# Patient Record
Sex: FEMALE | Race: WHITE | Employment: FULL TIME | URBAN - METROPOLITAN AREA
[De-identification: names, ages, dates, MRNs, and addresses within clinical notes are randomized per-mention and may not be internally consistent; named-entity substitution may affect disease eponyms.]

---

## 2017-01-04 ENCOUNTER — TELEPHONE (OUTPATIENT)
Dept: PULMONOLOGY | Age: 52
End: 2017-01-04

## 2017-01-10 ENCOUNTER — OFFICE VISIT (OUTPATIENT)
Dept: SLEEP MEDICINE | Age: 52
End: 2017-01-10

## 2017-01-10 VITALS
BODY MASS INDEX: 34.15 KG/M2 | HEIGHT: 64 IN | WEIGHT: 200 LBS | OXYGEN SATURATION: 98 % | DIASTOLIC BLOOD PRESSURE: 80 MMHG | HEART RATE: 81 BPM | SYSTOLIC BLOOD PRESSURE: 126 MMHG

## 2017-01-10 DIAGNOSIS — E66.9 NON MORBID OBESITY, UNSPECIFIED OBESITY TYPE: Chronic | ICD-10-CM

## 2017-01-10 DIAGNOSIS — J44.9 COPD, MILD (HCC): Chronic | ICD-10-CM

## 2017-01-10 DIAGNOSIS — K21.9 GASTROESOPHAGEAL REFLUX DISEASE WITHOUT ESOPHAGITIS: Chronic | ICD-10-CM

## 2017-01-10 DIAGNOSIS — E03.9 ACQUIRED HYPOTHYROIDISM: Chronic | ICD-10-CM

## 2017-01-10 DIAGNOSIS — G47.33 OBSTRUCTIVE SLEEP APNEA (ADULT) (PEDIATRIC): Primary | Chronic | ICD-10-CM

## 2017-01-10 PROCEDURE — 99214 OFFICE O/P EST MOD 30 MIN: CPT | Performed by: NURSE PRACTITIONER

## 2017-01-10 ASSESSMENT — ENCOUNTER SYMPTOMS
APNEA: 0
COUGH: 0
SINUS PRESSURE: 0
ABDOMINAL DISTENTION: 0
ABDOMINAL PAIN: 0
RHINORRHEA: 0
SHORTNESS OF BREATH: 0

## 2017-01-10 ASSESSMENT — SLEEP AND FATIGUE QUESTIONNAIRES
HOW LIKELY ARE YOU TO NOD OFF OR FALL ASLEEP IN A CAR, WHILE STOPPED FOR A FEW MINUTES IN TRAFFIC: 0
HOW LIKELY ARE YOU TO NOD OFF OR FALL ASLEEP WHEN YOU ARE A PASSENGER IN A CAR FOR AN HOUR WITHOUT A BREAK: 3
HOW LIKELY ARE YOU TO NOD OFF OR FALL ASLEEP WHILE WATCHING TV: 3
HOW LIKELY ARE YOU TO NOD OFF OR FALL ASLEEP WHILE SITTING AND TALKING TO SOMEONE: 0
HOW LIKELY ARE YOU TO NOD OFF OR FALL ASLEEP WHILE SITTING QUIETLY AFTER LUNCH WITHOUT ALCOHOL: 2
HOW LIKELY ARE YOU TO NOD OFF OR FALL ASLEEP WHILE SITTING AND READING: 3
HOW LIKELY ARE YOU TO NOD OFF OR FALL ASLEEP WHILE SITTING INACTIVE IN A PUBLIC PLACE: 1
HOW LIKELY ARE YOU TO NOD OFF OR FALL ASLEEP WHILE LYING DOWN TO REST IN THE AFTERNOON WHEN CIRCUMSTANCES PERMIT: 1
ESS TOTAL SCORE: 13

## 2017-05-30 ENCOUNTER — OFFICE VISIT (OUTPATIENT)
Dept: PULMONOLOGY | Age: 52
End: 2017-05-30

## 2017-05-30 VITALS
SYSTOLIC BLOOD PRESSURE: 129 MMHG | WEIGHT: 206 LBS | BODY MASS INDEX: 35.36 KG/M2 | DIASTOLIC BLOOD PRESSURE: 78 MMHG | HEART RATE: 80 BPM

## 2017-05-30 DIAGNOSIS — K21.9 GASTROESOPHAGEAL REFLUX DISEASE WITHOUT ESOPHAGITIS: Chronic | ICD-10-CM

## 2017-05-30 DIAGNOSIS — R06.02 SOB (SHORTNESS OF BREATH): Primary | ICD-10-CM

## 2017-05-30 DIAGNOSIS — J44.9 COPD, MILD (HCC): Chronic | ICD-10-CM

## 2017-05-30 DIAGNOSIS — G47.33 OBSTRUCTIVE SLEEP APNEA (ADULT) (PEDIATRIC): Chronic | ICD-10-CM

## 2017-05-30 PROCEDURE — 99214 OFFICE O/P EST MOD 30 MIN: CPT | Performed by: INTERNAL MEDICINE

## 2017-05-30 RX ORDER — OMEPRAZOLE 40 MG/1
40 CAPSULE, DELAYED RELEASE ORAL DAILY
Qty: 90 CAPSULE | Refills: 3 | Status: SHIPPED | OUTPATIENT
Start: 2017-05-30 | End: 2018-01-24 | Stop reason: SDUPTHER

## 2017-05-30 RX ORDER — LEVOTHYROXINE SODIUM 100 MCG
1 TABLET ORAL DAILY
COMMUNITY
Start: 2017-05-26

## 2017-05-30 RX ORDER — IPRATROPIUM BROMIDE AND ALBUTEROL SULFATE 2.5; .5 MG/3ML; MG/3ML
3 SOLUTION RESPIRATORY (INHALATION) EVERY 6 HOURS PRN
Qty: 360 ML | Refills: 3 | Status: SHIPPED | OUTPATIENT
Start: 2017-05-30 | End: 2017-12-22 | Stop reason: SDUPTHER

## 2017-11-28 ENCOUNTER — HOSPITAL ENCOUNTER (OUTPATIENT)
Dept: OTHER | Age: 52
Discharge: HOME | End: 2017-11-28
Attending: FAMILY MEDICINE | Admitting: FAMILY MEDICINE

## 2017-11-28 DIAGNOSIS — R94.5 ABNORMAL RESULTS OF LIVER FUNCTION STUDIES: ICD-10-CM

## 2017-12-04 RX ORDER — MONTELUKAST SODIUM 10 MG/1
TABLET ORAL
Qty: 90 TABLET | Refills: 2 | Status: SHIPPED | OUTPATIENT
Start: 2017-12-04 | End: 2018-08-08 | Stop reason: SDUPTHER

## 2017-12-22 ENCOUNTER — OFFICE VISIT (OUTPATIENT)
Dept: PULMONOLOGY | Age: 52
End: 2017-12-22

## 2017-12-22 VITALS
HEART RATE: 79 BPM | SYSTOLIC BLOOD PRESSURE: 119 MMHG | WEIGHT: 202 LBS | BODY MASS INDEX: 34.67 KG/M2 | DIASTOLIC BLOOD PRESSURE: 78 MMHG

## 2017-12-22 DIAGNOSIS — R53.82 CHRONIC FATIGUE: ICD-10-CM

## 2017-12-22 DIAGNOSIS — R06.02 SOB (SHORTNESS OF BREATH): Primary | ICD-10-CM

## 2017-12-22 DIAGNOSIS — J44.9 COPD, MILD (HCC): Chronic | ICD-10-CM

## 2017-12-22 DIAGNOSIS — G47.33 OBSTRUCTIVE SLEEP APNEA: Chronic | ICD-10-CM

## 2017-12-22 DIAGNOSIS — K21.9 GASTROESOPHAGEAL REFLUX DISEASE WITHOUT ESOPHAGITIS: Chronic | ICD-10-CM

## 2017-12-22 PROCEDURE — 99214 OFFICE O/P EST MOD 30 MIN: CPT | Performed by: INTERNAL MEDICINE

## 2017-12-22 NOTE — PROGRESS NOTES
extremities. There is no obvious skin rash. No focal neuro deficicts  Normal mood and affect    Allergies   Allergen Reactions    Codeine      Prior to Visit Medications    Medication Sig Taking? Authorizing Provider   montelukast (SINGULAIR) 10 MG tablet TAKE 1 TABLET DAILY Yes Leonardo Carranza CNP   SYNTHROID 100 MCG tablet Take 1 tablet by mouth daily Yes Historical Provider, MD   aclidinium (TUDORZA PRESSAIR) 400 MCG/ACT AEPB inhaler Inhale 1 puff into the lungs 2 times daily Yes Olive Marquez MD   albuterol-ipratropium (COMBIVENT RESPIMAT)  MCG/ACT AERS inhaler Inhale 1 puff into the lungs every 6 hours Yes Olive Marquez MD   omeprazole (PRILOSEC) 40 MG delayed release capsule Take 1 capsule by mouth daily Yes Olive Marquez MD   pregabalin (LYRICA) 150 MG capsule Take 150 mg by mouth 2 times daily Yes Historical Provider, MD   ondansetron (ZOFRAN-ODT) 4 MG disintegrating tablet Take 4 mg by mouth every 8 hours as needed for Nausea or Vomiting Yes Historical Provider, MD   nystatin (MYCOSTATIN) 624964 UNIT/ML suspension Take 500,000 Units by mouth 4 times daily Yes Historical Provider, MD   omeprazole (PRILOSEC) 40 MG capsule TAKE 1 CAPSULE BY MOUTH DAILY Yes Olive Marquez MD   ipratropium-albuterol (DUONEB) 0.5-2.5 (3) MG/3ML SOLN nebulizer solution INHALE 3 MLS INTO THE LUNGS EVERY 4 HOURS. Yes Olive Marquez MD   albuterol (PROVENTIL HFA;VENTOLIN HFA) 108 (90 BASE) MCG/ACT inhaler Inhale 2 puffs into the lungs every 6 hours as needed Yes Olive Marquez MD   cyanocobalamin 1000 MCG/ML injection Inject 1,000 mcg into the muscle every 7 days. Yes Historical Provider, MD   traMADol (ULTRAM) 50 MG tablet Take 50 mg by mouth every 6 hours as needed. Yes Historical Provider, MD   LORazepam (ATIVAN) 0.5 MG tablet Take 0.5 mg by mouth every 6 hours as needed.    Yes Historical Provider, MD   fluticasone-salmeterol (ADVAIR DISKUS) 250-50 MCG/DOSE AEPB Inhale 1 puff into the lungs

## 2018-01-09 ENCOUNTER — OFFICE VISIT (OUTPATIENT)
Dept: SLEEP MEDICINE | Age: 53
End: 2018-01-09

## 2018-01-09 VITALS
BODY MASS INDEX: 35 KG/M2 | OXYGEN SATURATION: 97 % | SYSTOLIC BLOOD PRESSURE: 122 MMHG | DIASTOLIC BLOOD PRESSURE: 72 MMHG | HEIGHT: 64 IN | HEART RATE: 88 BPM | WEIGHT: 205 LBS

## 2018-01-09 DIAGNOSIS — J44.9 COPD, MILD (HCC): Chronic | ICD-10-CM

## 2018-01-09 DIAGNOSIS — K21.9 GASTROESOPHAGEAL REFLUX DISEASE WITHOUT ESOPHAGITIS: Chronic | ICD-10-CM

## 2018-01-09 DIAGNOSIS — E66.9 NON MORBID OBESITY, UNSPECIFIED OBESITY TYPE: Chronic | ICD-10-CM

## 2018-01-09 DIAGNOSIS — G47.33 OBSTRUCTIVE SLEEP APNEA: Primary | Chronic | ICD-10-CM

## 2018-01-09 DIAGNOSIS — E03.9 ACQUIRED HYPOTHYROIDISM: Chronic | ICD-10-CM

## 2018-01-09 PROCEDURE — 99214 OFFICE O/P EST MOD 30 MIN: CPT | Performed by: NURSE PRACTITIONER

## 2018-01-09 RX ORDER — HYDROCHLOROTHIAZIDE 25 MG/1
25 TABLET ORAL
COMMUNITY
Start: 2017-12-18 | End: 2019-01-15

## 2018-01-09 RX ORDER — CLOTRIMAZOLE AND BETAMETHASONE DIPROPIONATE 10; .64 MG/G; MG/G
1 CREAM TOPICAL 2 TIMES DAILY
COMMUNITY
Start: 2018-01-05 | End: 2022-04-05

## 2018-01-09 RX ORDER — INSULIN DETEMIR 100 [IU]/ML
25 INJECTION, SOLUTION SUBCUTANEOUS 2 TIMES DAILY
COMMUNITY
Start: 2017-12-29 | End: 2019-01-15

## 2018-01-09 RX ORDER — ATORVASTATIN CALCIUM 10 MG/1
10 TABLET, FILM COATED ORAL DAILY
COMMUNITY
Start: 2017-11-27

## 2018-01-09 RX ORDER — GLYBURIDE 5 MG/1
5 TABLET ORAL 2 TIMES DAILY
COMMUNITY
Start: 2017-12-18 | End: 2019-01-15

## 2018-01-09 RX ORDER — FLUCONAZOLE 150 MG/1
150 TABLET ORAL DAILY
COMMUNITY
Start: 2018-01-05 | End: 2018-03-07 | Stop reason: ALTCHOICE

## 2018-01-09 ASSESSMENT — ENCOUNTER SYMPTOMS
COUGH: 0
SHORTNESS OF BREATH: 0
ABDOMINAL DISTENTION: 0
APNEA: 0
RHINORRHEA: 0
SINUS PRESSURE: 0
ABDOMINAL PAIN: 0

## 2018-01-09 NOTE — PROGRESS NOTES
daily 5/26/17  Yes Historical Provider, MD   aclidinium (TUDORZA PRESSAIR) 400 MCG/ACT AEPB inhaler Inhale 1 puff into the lungs 2 times daily 5/30/17  Yes Luis Painting MD   albuterol-ipratropium (COMBIVENT RESPIMAT)  MCG/ACT AERS inhaler Inhale 1 puff into the lungs every 6 hours 5/30/17  Yes Luis Painting MD   omeprazole (PRILOSEC) 40 MG capsule TAKE 1 CAPSULE BY MOUTH DAILY 5/31/16  Yes Luis Painting MD   ipratropium-albuterol (DUONEB) 0.5-2.5 (3) MG/3ML SOLN nebulizer solution INHALE 3 MLS INTO THE LUNGS EVERY 4 HOURS. 5/31/16  Yes Luis Painting MD   albuterol (PROVENTIL HFA;VENTOLIN HFA) 108 (90 BASE) MCG/ACT inhaler Inhale 2 puffs into the lungs every 6 hours as needed 5/15/15  Yes Luis Painting MD   cyanocobalamin 1000 MCG/ML injection Inject 1,000 mcg into the muscle every 7 days. Yes Historical Provider, MD   traMADol (ULTRAM) 50 MG tablet Take 50 mg by mouth every 6 hours as needed. Yes Historical Provider, MD   LORazepam (ATIVAN) 0.5 MG tablet Take 0.5 mg by mouth every 6 hours as needed.      Yes Historical Provider, MD   omeprazole (PRILOSEC) 40 MG delayed release capsule Take 1 capsule by mouth daily 5/30/17   Luis Painting MD   pregabalin (LYRICA) 150 MG capsule Take 150 mg by mouth 2 times daily    Historical Provider, MD   ondansetron (ZOFRAN-ODT) 4 MG disintegrating tablet Take 4 mg by mouth every 8 hours as needed for Nausea or Vomiting    Historical Provider, MD   nystatin (MYCOSTATIN) 664207 UNIT/ML suspension Take 500,000 Units by mouth 4 times daily    Historical Provider, MD   fluticasone-salmeterol (ADVAIR DISKUS) 250-50 MCG/DOSE AEPB Inhale 1 puff into the lungs every 12 hours 5/31/16   Luis Painting MD       Allergies as of 01/09/2018 - Review Complete 01/09/2018   Allergen Reaction Noted    Codeine  07/31/2013       Patient Active Problem List   Diagnosis    COPD (chronic obstructive pulmonary disease) ([facility]    GERD (gastroesophageal reflux disease)    Fatigue    COPD, mild (HCC)    Acquired hypothyroidism    Obstructive sleep apnea    SOB (shortness of breath)       Past Medical History:   Diagnosis Date    Anxiety     B12 deficiency anemia     COPD (chronic obstructive pulmonary disease) (HCC)     Depression     Fatty liver     Fibromyalgia     PT DENIES    Obstructive sleep apnea        Past Surgical History:   Procedure Laterality Date    CHOLECYSTECTOMY  09/13/2016    COLONOSCOPY  april 13, 2015    CYST REMOVAL      HEMORRHOID SURGERY      1997    HEMORRHOID SURGERY      TUBAL LIGATION      WISDOM TOOTH EXTRACTION         Family History   Problem Relation Age of Onset    Diabetes Mother     Arthritis Father     Cancer Maternal Aunt     Arthritis Maternal Aunt     High Blood Pressure Maternal Grandmother     Cancer Maternal Grandfather        Vitals:  Weight BMI   Wt Readings from Last 3 Encounters:   01/09/18 205 lb (93 kg)   12/22/17 202 lb (91.6 kg)   05/30/17 206 lb (93.4 kg)    Body mass index is 35.19 kg/m². BP HR SaO2   BP Readings from Last 3 Encounters:   01/09/18 122/72   12/22/17 119/78   05/30/17 129/78    Pulse Readings from Last 3 Encounters:   01/09/18 88   12/22/17 79   05/30/17 80    SpO2 Readings from Last 3 Encounters:   01/09/18 97%   01/10/17 98%   11/30/16 98%        Assessment:     1. Obstructive sleep apnea Stable    2. COPD, mild ([address] Stable    3. Gastroesophageal reflux disease without esophagitis Stable    4. Acquired hypothyroidism Stable    5. Non morbid obesity, unspecified obesity type Stable        The chronic medical conditions listed are directly related to the primary diagnosis listed above. The management of the primary diagnosis affects the secondary diagnosis and vice versa.   Plan:   - Educated patient and reviewed compliance download with pt.    -Supplies and parts as needed for her machine, these are medically necessary.    - Patient using [employer] for supplies  -Continue

## 2018-01-24 RX ORDER — OMEPRAZOLE 40 MG/1
40 CAPSULE, DELAYED RELEASE ORAL 2 TIMES DAILY
Qty: 180 CAPSULE | Refills: 3 | Status: SHIPPED | OUTPATIENT
Start: 2018-01-24 | End: 2018-04-02 | Stop reason: SDUPTHER

## 2018-01-24 NOTE — TELEPHONE ENCOUNTER
From: Kalin Sarabia  Sent: 1/18/2018 9:37 PM EST  Subject: Medication Renewal Request    Kalin Cornell would like a refill of the following medications:  omeprazole (PRILOSEC) 40 MG delayed release capsule Heaven Maradiaga MD]    Preferred pharmacy: [address] [place] [address] , [address] [facility] [phone] - F [phone]    Comment: In my early 2017 Dr. Lucy Moore and I discussed me taking this medicine 2x a day and the refills have not be updated to reflect this. I need an immediate 90 day x2 times a day prescription submitted to [facility] as soon as possible, please. Thank you.

## 2018-04-05 ENCOUNTER — TELEPHONE (OUTPATIENT)
Dept: SLEEP MEDICINE | Age: 53
End: 2018-04-05

## 2018-04-19 ENCOUNTER — HOSPITAL ENCOUNTER (OUTPATIENT)
Dept: ENDOSCOPY | Age: 53
Discharge: HOME | End: 2018-04-19
Attending: INTERNAL MEDICINE | Admitting: INTERNAL MEDICINE

## 2018-04-19 LAB
GLUCOSE BLD-MCNC: 112 MG/DL (ref 70–99)
GLUCOSE BLD-MCNC: 124 MG/DL (ref 70–99)
GLUCOSE BLD-MCNC: 140 MG/DL (ref 70–99)
PERFORMED ON: ABNORMAL
POC POTASSIUM: 3.6 MMOL/L (ref 3.5–5.1)
POC SAMPLE TYPE: ABNORMAL

## 2018-05-02 ENCOUNTER — HOSPITAL ENCOUNTER (OUTPATIENT)
Dept: OTHER | Age: 53
Discharge: HOME | End: 2018-05-02
Attending: FAMILY MEDICINE | Admitting: FAMILY MEDICINE

## 2018-05-02 VITALS — WEIGHT: 208 LBS | BODY MASS INDEX: 35.51 KG/M2 | HEIGHT: 64 IN

## 2018-05-02 DIAGNOSIS — D32.0 BENIGN MENINGIOMA OF BRAIN (HCC): ICD-10-CM

## 2018-05-02 RX ORDER — FLUDEOXYGLUCOSE F 18 200 MCI/ML
13.4 INJECTION, SOLUTION INTRAVENOUS
Status: COMPLETED | OUTPATIENT
Start: 2018-05-02 | End: 2018-05-02

## 2018-05-02 RX ADMIN — FLUDEOXYGLUCOSE F 18 13.4 MILLICURIE: 200 INJECTION, SOLUTION INTRAVENOUS at 08:21

## 2018-06-20 ENCOUNTER — OFFICE VISIT (OUTPATIENT)
Dept: PULMONOLOGY | Age: 53
End: 2018-06-20

## 2018-06-20 ENCOUNTER — TELEPHONE (OUTPATIENT)
Dept: PULMONOLOGY | Age: 53
End: 2018-06-20

## 2018-06-20 VITALS
SYSTOLIC BLOOD PRESSURE: 139 MMHG | WEIGHT: 205 LBS | HEART RATE: 79 BPM | BODY MASS INDEX: 35.19 KG/M2 | OXYGEN SATURATION: 97 % | DIASTOLIC BLOOD PRESSURE: 85 MMHG

## 2018-06-20 DIAGNOSIS — J44.9 COPD, MILD (HCC): Chronic | ICD-10-CM

## 2018-06-20 DIAGNOSIS — R06.02 SOB (SHORTNESS OF BREATH): Primary | ICD-10-CM

## 2018-06-20 DIAGNOSIS — K21.9 GASTROESOPHAGEAL REFLUX DISEASE WITHOUT ESOPHAGITIS: Chronic | ICD-10-CM

## 2018-06-20 DIAGNOSIS — G47.33 OBSTRUCTIVE SLEEP APNEA: Chronic | ICD-10-CM

## 2018-06-20 PROCEDURE — 99214 OFFICE O/P EST MOD 30 MIN: CPT | Performed by: INTERNAL MEDICINE

## 2018-06-20 RX ORDER — ALBUTEROL SULFATE 90 UG/1
2 AEROSOL, METERED RESPIRATORY (INHALATION) EVERY 6 HOURS PRN
Qty: 3 INHALER | Refills: 3 | Status: SHIPPED | OUTPATIENT
Start: 2018-06-20 | End: 2018-10-05 | Stop reason: SDUPTHER

## 2018-06-20 RX ORDER — OMEPRAZOLE 40 MG/1
CAPSULE, DELAYED RELEASE ORAL
Qty: 90 CAPSULE | Refills: 3 | Status: SHIPPED | OUTPATIENT
Start: 2018-06-20 | End: 2018-11-05 | Stop reason: SDUPTHER

## 2018-06-20 RX ORDER — IPRATROPIUM BROMIDE AND ALBUTEROL SULFATE 2.5; .5 MG/3ML; MG/3ML
SOLUTION RESPIRATORY (INHALATION)
Qty: 360 VIAL | Refills: 3 | Status: SHIPPED | OUTPATIENT
Start: 2018-06-20 | End: 2019-06-26 | Stop reason: SDUPTHER

## 2018-08-08 ENCOUNTER — HOSPITAL ENCOUNTER (OUTPATIENT)
Dept: PULMONOLOGY | Age: 53
Discharge: HOME | End: 2018-08-08
Attending: INTERNAL MEDICINE | Admitting: INTERNAL MEDICINE

## 2018-08-08 VITALS — OXYGEN SATURATION: 95 %

## 2018-08-08 DIAGNOSIS — J44.9 COPD, MILD (HCC): Chronic | ICD-10-CM

## 2018-08-08 DIAGNOSIS — R06.02 SOB (SHORTNESS OF BREATH): ICD-10-CM

## 2018-08-08 DIAGNOSIS — R06.02 SHORTNESS OF BREATH: ICD-10-CM

## 2018-08-08 RX ORDER — MONTELUKAST SODIUM 10 MG/1
TABLET ORAL
Qty: 90 TABLET | Refills: 2 | Status: SHIPPED | OUTPATIENT
Start: 2018-08-08 | End: 2019-05-06 | Stop reason: SDUPTHER

## 2018-09-18 ENCOUNTER — TELEPHONE (OUTPATIENT)
Dept: PULMONOLOGY | Age: 53
End: 2018-09-18

## 2018-09-18 NOTE — TELEPHONE ENCOUNTER
Patient calling for Results    What type of test? : PFT    Who ordered the test? : DR. Kathleen Auguste    When was it done? : 18    Where was it done? : [facility]    Pt # [phone]        REFILLS:     Type of Medication: TUDORZA    Dosage: 400 MCG/ACT    How are you takin Akanksha Acuna and Location: [address] [place] [address] phone number: [phone]    When was the last appointment: 18, NEXT OV 18    Pt # [phone]

## 2018-10-08 RX ORDER — ALBUTEROL SULFATE 90 UG/1
2 AEROSOL, METERED RESPIRATORY (INHALATION) EVERY 6 HOURS PRN
Qty: 3 INHALER | Refills: 3 | Status: SHIPPED | OUTPATIENT
Start: 2018-10-08 | End: 2019-06-26 | Stop reason: SDUPTHER

## 2018-11-05 ENCOUNTER — TELEPHONE (OUTPATIENT)
Dept: PULMONOLOGY | Age: 53
End: 2018-11-05

## 2018-11-05 RX ORDER — OMEPRAZOLE 40 MG/1
CAPSULE, DELAYED RELEASE ORAL
Qty: 180 CAPSULE | Refills: 3 | Status: SHIPPED | OUTPATIENT
Start: 2018-11-05 | End: 2019-06-26 | Stop reason: SDUPTHER

## 2018-12-10 ENCOUNTER — TELEPHONE (OUTPATIENT)
Dept: PULMONOLOGY | Age: 53
End: 2018-12-10

## 2018-12-19 ENCOUNTER — OFFICE VISIT (OUTPATIENT)
Dept: PULMONOLOGY | Age: 53
End: 2018-12-19
Payer: COMMERCIAL

## 2018-12-19 VITALS
BODY MASS INDEX: 34.5 KG/M2 | SYSTOLIC BLOOD PRESSURE: 133 MMHG | OXYGEN SATURATION: 97 % | DIASTOLIC BLOOD PRESSURE: 79 MMHG | HEART RATE: 85 BPM | WEIGHT: 201 LBS

## 2018-12-19 DIAGNOSIS — J44.9 COPD, MILD (HCC): Chronic | ICD-10-CM

## 2018-12-19 DIAGNOSIS — G47.33 OBSTRUCTIVE SLEEP APNEA: Chronic | ICD-10-CM

## 2018-12-19 DIAGNOSIS — R06.02 SOB (SHORTNESS OF BREATH): Primary | ICD-10-CM

## 2018-12-19 DIAGNOSIS — K21.9 GASTROESOPHAGEAL REFLUX DISEASE WITHOUT ESOPHAGITIS: Chronic | ICD-10-CM

## 2018-12-19 PROCEDURE — 99214 OFFICE O/P EST MOD 30 MIN: CPT | Performed by: INTERNAL MEDICINE

## 2018-12-19 RX ORDER — DOXYCYCLINE HYCLATE 100 MG/1
100 CAPSULE ORAL 2 TIMES DAILY
Qty: 20 CAPSULE | Refills: 0 | Status: SHIPPED | OUTPATIENT
Start: 2018-12-19 | End: 2018-12-29

## 2018-12-19 NOTE — PROGRESS NOTES
clotrimazole-betamethasone (LOTRISONE) 1-0.05 % cream Apply 1 % topically 2 times daily  Historical Provider, MD   glyBURIDE (DIABETA) 5 MG tablet Take 5 mg by mouth 2 times daily  Historical Provider, MD   hydrochlorothiazide (HYDRODIURIL) 25 MG tablet Take 25 mg by mouth Daily with lunch  Historical Provider, MD   LEVEMIR FLEXTOUCH 100 UNIT/ML injection pen Inject 25 Units as directed 2 times daily  Historical Provider, MD   SYNTHROID 100 MCG tablet Take 1 tablet by mouth daily  Historical Provider, MD   nystatin (MYCOSTATIN) 071958 UNIT/ML suspension Take 500,000 Units by mouth 4 times daily  Historical Provider, MD   fluticasone-salmeterol (ADVAIR DISKUS) 250-50 MCG/DOSE AEPB Inhale 1 puff into the lungs every 12 hours  Corey Atwood MD   cyanocobalamin 1000 MCG/ML injection Inject 1,000 mcg into the muscle every 7 days. Historical Provider, MD   traMADol (ULTRAM) 50 MG tablet Take 50 mg by mouth every 6 hours as needed. Historical Provider, MD   LORazepam (ATIVAN) 0.5 MG tablet Take 0.5 mg by mouth every 6 hours as needed. Historical Provider, MD       Vitals:    12/19/18 1439   BP: 133/79   Pulse: 85   SpO2: 97%   Weight: 201 lb (91.2 kg)     Body mass index is 34.5 kg/m².      Wt Readings from Last 3 Encounters:   12/19/18 201 lb (91.2 kg)   06/20/18 205 lb (93 kg)   05/02/18 208 lb (94.3 kg)     BP Readings from Last 3 Encounters:   12/19/18 133/79   06/20/18 139/85   01/09/18 122/72        History   Smoking Status    Former Smoker    Packs/day: 1.00    Years: 27.00    Types: Cigarettes    Quit date: 12/17/2012   Smokeless Tobacco    Never Used

## 2019-01-15 ENCOUNTER — OFFICE VISIT (OUTPATIENT)
Dept: PULMONOLOGY | Age: 54
End: 2019-01-15
Payer: COMMERCIAL

## 2019-01-15 VITALS
OXYGEN SATURATION: 98 % | DIASTOLIC BLOOD PRESSURE: 70 MMHG | SYSTOLIC BLOOD PRESSURE: 118 MMHG | BODY MASS INDEX: 34.15 KG/M2 | HEIGHT: 64 IN | HEART RATE: 88 BPM | WEIGHT: 200 LBS

## 2019-01-15 DIAGNOSIS — K21.9 GASTROESOPHAGEAL REFLUX DISEASE WITHOUT ESOPHAGITIS: Chronic | ICD-10-CM

## 2019-01-15 DIAGNOSIS — J44.9 CHRONIC OBSTRUCTIVE PULMONARY DISEASE, UNSPECIFIED COPD TYPE (HCC): Chronic | ICD-10-CM

## 2019-01-15 DIAGNOSIS — G47.33 OBSTRUCTIVE SLEEP APNEA: Primary | Chronic | ICD-10-CM

## 2019-01-15 DIAGNOSIS — E66.2 CLASS 1 OBESITY WITH ALVEOLAR HYPOVENTILATION WITHOUT SERIOUS COMORBIDITY WITH BODY MASS INDEX (BMI) OF 34.0 TO 34.9 IN ADULT (HCC): ICD-10-CM

## 2019-01-15 PROBLEM — E66.811 CLASS 1 OBESITY WITH ALVEOLAR HYPOVENTILATION WITHOUT SERIOUS COMORBIDITY WITH BODY MASS INDEX (BMI) OF 34.0 TO 34.9 IN ADULT: Status: ACTIVE | Noted: 2019-01-15

## 2019-01-15 PROCEDURE — 99214 OFFICE O/P EST MOD 30 MIN: CPT | Performed by: NURSE PRACTITIONER

## 2019-01-15 ASSESSMENT — SLEEP AND FATIGUE QUESTIONNAIRES
HOW LIKELY ARE YOU TO NOD OFF OR FALL ASLEEP WHEN YOU ARE A PASSENGER IN A CAR FOR AN HOUR WITHOUT A BREAK: 3
HOW LIKELY ARE YOU TO NOD OFF OR FALL ASLEEP WHILE SITTING AND TALKING TO SOMEONE: 1
HOW LIKELY ARE YOU TO NOD OFF OR FALL ASLEEP WHILE SITTING QUIETLY AFTER LUNCH WITHOUT ALCOHOL: 3
HOW LIKELY ARE YOU TO NOD OFF OR FALL ASLEEP WHILE SITTING INACTIVE IN A PUBLIC PLACE: 2
HOW LIKELY ARE YOU TO NOD OFF OR FALL ASLEEP WHILE WATCHING TV: 3
HOW LIKELY ARE YOU TO NOD OFF OR FALL ASLEEP IN A CAR, WHILE STOPPED FOR A FEW MINUTES IN TRAFFIC: 0
HOW LIKELY ARE YOU TO NOD OFF OR FALL ASLEEP WHILE LYING DOWN TO REST IN THE AFTERNOON WHEN CIRCUMSTANCES PERMIT: 3
HOW LIKELY ARE YOU TO NOD OFF OR FALL ASLEEP WHILE SITTING AND READING: 3
ESS TOTAL SCORE: 18

## 2019-01-15 ASSESSMENT — ENCOUNTER SYMPTOMS
RHINORRHEA: 0
SHORTNESS OF BREATH: 0
APNEA: 0
ABDOMINAL DISTENTION: 0
ABDOMINAL PAIN: 0
SINUS PRESSURE: 0
EYE REDNESS: 0
EYE PAIN: 0
COUGH: 0

## 2019-05-06 RX ORDER — MONTELUKAST SODIUM 10 MG/1
TABLET ORAL
Qty: 90 TABLET | Refills: 3 | Status: SHIPPED | OUTPATIENT
Start: 2019-05-06 | End: 2020-06-22

## 2019-05-29 ENCOUNTER — HOSPITAL ENCOUNTER (OUTPATIENT)
Age: 54
Discharge: HOME | End: 2019-05-29
Payer: COMMERCIAL

## 2019-05-29 ENCOUNTER — HOSPITAL ENCOUNTER (OUTPATIENT)
Dept: GENERAL RADIOLOGY | Age: 54
Discharge: HOME | End: 2019-05-29
Payer: COMMERCIAL

## 2019-05-29 DIAGNOSIS — J06.9 ACUTE RESPIRATORY DISEASE: ICD-10-CM

## 2019-05-29 PROCEDURE — 71046 X-RAY EXAM CHEST 2 VIEWS: CPT

## 2019-06-26 ENCOUNTER — OFFICE VISIT (OUTPATIENT)
Dept: PULMONOLOGY | Age: 54
End: 2019-06-26
Payer: COMMERCIAL

## 2019-06-26 VITALS
OXYGEN SATURATION: 98 % | HEART RATE: 85 BPM | SYSTOLIC BLOOD PRESSURE: 135 MMHG | DIASTOLIC BLOOD PRESSURE: 85 MMHG | WEIGHT: 204 LBS | BODY MASS INDEX: 35.02 KG/M2

## 2019-06-26 DIAGNOSIS — G47.33 OBSTRUCTIVE SLEEP APNEA: Chronic | ICD-10-CM

## 2019-06-26 DIAGNOSIS — Z87.891 FORMER SMOKER: ICD-10-CM

## 2019-06-26 DIAGNOSIS — R06.02 SOB (SHORTNESS OF BREATH): Primary | ICD-10-CM

## 2019-06-26 DIAGNOSIS — K21.9 GASTROESOPHAGEAL REFLUX DISEASE WITHOUT ESOPHAGITIS: Chronic | ICD-10-CM

## 2019-06-26 DIAGNOSIS — J44.9 COPD, MILD (HCC): Chronic | ICD-10-CM

## 2019-06-26 PROCEDURE — 99214 OFFICE O/P EST MOD 30 MIN: CPT | Performed by: INTERNAL MEDICINE

## 2019-06-26 RX ORDER — OMEPRAZOLE 40 MG/1
CAPSULE, DELAYED RELEASE ORAL
Qty: 180 CAPSULE | Refills: 3 | Status: SHIPPED | OUTPATIENT
Start: 2019-06-26 | End: 2020-07-07

## 2019-06-26 RX ORDER — IPRATROPIUM BROMIDE AND ALBUTEROL SULFATE 2.5; .5 MG/3ML; MG/3ML
SOLUTION RESPIRATORY (INHALATION)
Qty: 360 VIAL | Refills: 3 | Status: SHIPPED | OUTPATIENT
Start: 2019-06-26 | End: 2019-06-27 | Stop reason: DRUGHIGH

## 2019-06-26 RX ORDER — ALBUTEROL SULFATE 90 UG/1
2 AEROSOL, METERED RESPIRATORY (INHALATION) EVERY 6 HOURS PRN
Qty: 3 INHALER | Refills: 3 | Status: SHIPPED | OUTPATIENT
Start: 2019-06-26 | End: 2020-07-13

## 2019-06-26 NOTE — PROGRESS NOTES
[facility]  Progress Note  Saundra Reyes MD       Arlette Delaney   YOB: 1965    Date of Visit:  6/26/2019    Assessment/Plan:  1. SOB (shortness of breath)  Better since her flare up    2. COPD, mild ([address]  PFT 10/15:  Spirometry reveals normal FVC and FEV-1. FEV-1/FVC ratio is reduced. Lung  volumes reveal normal total lung capacity. Vital capacity is increased. Residual volume is decreased. Diffusion capacity is mildly decreased at 74%  of predicted. In comparison to a study from August of 2013, spirometry is  similar.       IMPRESSION: Mild obstructive lung disease. PFT 8/18:  INTERPRETATION:  Spirometry attempts were acceptable and reproducible. FVC  was normal at 3.17 liters, 91% predicted and normal FEV1 2.46 liters, 90%  predicted. FEV1/FVC ratio was normal.  Lung volumes showed normal total  lung capacity of 90% predicted. Diffusion capacity showed decreased DLCO  of 70% predicted.     IMPRESSION:  Normal spirometry and lung volumes with mild decrease in  diffusion capacity. In comparison to the test that was done in 10/2015,  FEV1 has improved by 9%. Advair==> gave her thrush  Tudorza  Duoneb/Combivent prn. And in advance of activity    Now sounds clear    3. Obstructive sleep apnea (adult) (pediatric)  CPAP settings: Auto CPAP mode Pmin-6 Pmax-16 Flex-3 Hum-3 Average P- 7.6 Comp 7.5 hrs/night Download AHI - 2.1/hr Mask- Nasal Pillows Machine download/SD card data reviewed and documented. Excellent treatment effect  Follows with Dr Barb Herr    4. 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 Gastroesophageal reflux disease without esophagitis  Omeprazole 40 mg bid helps     6. Former smoker  LDCT chest afte rshe turns 53      FOLLOW UP: 6 months      Chief Complaint   Patient presents with    Shortness of Breath     was sick from Feb to end of May.  Caught the Flu from her son    Alisa Needs albuterol-ipratropium (COMBIVENT RESPIMAT)  MCG/ACT AERS inhaler Inhale 1 puff into the lungs every 6 hours  Adrienne Arthur MD   ipratropium-albuterol (DUONEB) 0.5-2.5 (3) MG/3ML SOLN nebulizer solution INHALE 3 MLS INTO THE LUNGS EVERY 4 HOURS. Adrienne Arthur MD   aspirin 81 MG tablet Take 81 mg by mouth daily  Historical Provider, MD   atorvastatin (LIPITOR) 10 MG tablet Take 10 mg by mouth daily  Historical Provider, MD   clotrimazole-betamethasone (LOTRISONE) 1-0.05 % cream Apply 1 % topically 2 times daily  Historical Provider, MD   SYNTHROID 100 MCG tablet Take 1 tablet by mouth daily  Historical Provider, MD   nystatin (MYCOSTATIN) 333445 UNIT/ML suspension Take 500,000 Units by mouth 4 times daily  Historical Provider, MD   cyanocobalamin 1000 MCG/ML injection Inject 1,000 mcg into the muscle every 7 days. Historical Provider, MD   traMADol (ULTRAM) 50 MG tablet Take 50 mg by mouth every 6 hours as needed. Historical Provider, MD   LORazepam (ATIVAN) 0.5 MG tablet Take 0.5 mg by mouth every 6 hours as needed. Historical Provider, MD       Vitals:    19 1505   BP: 135/85   Pulse: 85   SpO2: 98%   Weight: 204 lb (92.5 kg)     Body mass index is 35.02 kg/m².      Wt Readings from Last 3 Encounters:   19 204 lb (92.5 kg)   01/15/19 200 lb (90.7 kg)   18 201 lb (91.2 kg)     BP Readings from Last 3 Encounters:   19 135/85   01/15/19 118/70   18 133/79        Social History     Tobacco Use   Smoking Status Former Smoker    Packs/day: 1.00    Years: 32.00    Pack years: 32.00    Types: Cigarettes    Last attempt to quit: 2012    Years since quittin.5   Smokeless Tobacco Never Used

## 2019-06-27 ENCOUNTER — TELEPHONE (OUTPATIENT)
Dept: PULMONOLOGY | Age: 54
End: 2019-06-27

## 2019-06-27 RX ORDER — IPRATROPIUM BROMIDE AND ALBUTEROL SULFATE 2.5; .5 MG/3ML; MG/3ML
SOLUTION RESPIRATORY (INHALATION)
Qty: 270 VIAL | Refills: 3 | Status: SHIPPED | OUTPATIENT
Start: 2019-06-27 | End: 2020-07-20

## 2019-06-27 NOTE — TELEPHONE ENCOUNTER
[facility]ed[facility] Lerm[facility] with [facility] called stated the pharmacy has a question regarding the Duo-neb they wanted to see if it is alright to add prn to the prescription. She stated there is another question but she wasn't able to speak about it and wanted us to call and speak to a pharmacist regarding this.  Pharmacy is open 9 am- 530 pm.  Bebe Max [phone] ref # [id number]

## 2019-07-02 ENCOUNTER — PATIENT MESSAGE (OUTPATIENT)
Dept: PULMONOLOGY | Age: 54
End: 2019-07-02

## 2019-08-09 ENCOUNTER — TELEPHONE (OUTPATIENT)
Dept: PULMONOLOGY | Age: 54
End: 2019-08-09

## 2019-08-09 NOTE — TELEPHONE ENCOUNTER
If pt. Feels she needs more time off on her FMLA she will need an appt. To evaluate . I tried to call pt but unable to leave a message.

## 2019-08-27 ENCOUNTER — HOSPITAL ENCOUNTER (OUTPATIENT)
Dept: NEUROLOGY | Age: 54
Discharge: HOME | End: 2019-08-27
Payer: COMMERCIAL

## 2019-08-27 PROCEDURE — 95886 MUSC TEST DONE W/N TEST COMP: CPT

## 2019-08-27 PROCEDURE — 95911 NRV CNDJ TEST 9-10 STUDIES: CPT

## 2019-08-27 PROCEDURE — 95886 MUSC TEST DONE W/N TEST COMP: CPT | Performed by: PSYCHIATRY & NEUROLOGY

## 2019-08-27 PROCEDURE — 95911 NRV CNDJ TEST 9-10 STUDIES: CPT | Performed by: PSYCHIATRY & NEUROLOGY

## 2019-11-05 ENCOUNTER — HOSPITAL ENCOUNTER (OUTPATIENT)
Dept: CT IMAGING | Age: 54
Discharge: HOME | End: 2019-11-05
Payer: COMMERCIAL

## 2019-11-05 DIAGNOSIS — Z87.891 FORMER SMOKER: ICD-10-CM

## 2019-11-05 PROCEDURE — G0297 LDCT FOR LUNG CA SCREEN: HCPCS

## 2019-11-08 ENCOUNTER — TELEPHONE (OUTPATIENT)
Dept: PULMONOLOGY | Age: 54
End: 2019-11-08

## 2019-11-11 ENCOUNTER — TELEPHONE (OUTPATIENT)
Dept: CASE MANAGEMENT | Age: 54
End: 2019-11-11

## 2019-11-18 RX ORDER — ACLIDINIUM BROMIDE 400 UG/1
POWDER, METERED RESPIRATORY (INHALATION)
Qty: 3 EACH | Refills: 3 | Status: SHIPPED | OUTPATIENT
Start: 2019-11-18 | End: 2020-01-06

## 2020-01-06 ENCOUNTER — OFFICE VISIT (OUTPATIENT)
Dept: PULMONOLOGY | Age: 55
End: 2020-01-06
Payer: COMMERCIAL

## 2020-01-06 VITALS
SYSTOLIC BLOOD PRESSURE: 125 MMHG | OXYGEN SATURATION: 98 % | DIASTOLIC BLOOD PRESSURE: 80 MMHG | WEIGHT: 208 LBS | HEART RATE: 89 BPM | BODY MASS INDEX: 35.7 KG/M2

## 2020-01-06 VITALS
OXYGEN SATURATION: 98 % | DIASTOLIC BLOOD PRESSURE: 80 MMHG | SYSTOLIC BLOOD PRESSURE: 126 MMHG | BODY MASS INDEX: 35.68 KG/M2 | WEIGHT: 209 LBS | HEART RATE: 76 BPM | HEIGHT: 64 IN

## 2020-01-06 PROCEDURE — 99214 OFFICE O/P EST MOD 30 MIN: CPT | Performed by: INTERNAL MEDICINE

## 2020-01-06 PROCEDURE — 99214 OFFICE O/P EST MOD 30 MIN: CPT | Performed by: NURSE PRACTITIONER

## 2020-01-06 RX ORDER — PREGABALIN 150 MG/1
150 CAPSULE ORAL 2 TIMES DAILY
COMMUNITY

## 2020-01-06 ASSESSMENT — ENCOUNTER SYMPTOMS
RHINORRHEA: 0
APNEA: 0
SHORTNESS OF BREATH: 0
ABDOMINAL DISTENTION: 0
SINUS PRESSURE: 0
ABDOMINAL PAIN: 0
COUGH: 0
EYE REDNESS: 0
EYE PAIN: 0

## 2020-01-06 ASSESSMENT — SLEEP AND FATIGUE QUESTIONNAIRES
HOW LIKELY ARE YOU TO NOD OFF OR FALL ASLEEP WHILE LYING DOWN TO REST IN THE AFTERNOON WHEN CIRCUMSTANCES PERMIT: 0
HOW LIKELY ARE YOU TO NOD OFF OR FALL ASLEEP WHILE SITTING INACTIVE IN A PUBLIC PLACE: 0
HOW LIKELY ARE YOU TO NOD OFF OR FALL ASLEEP WHEN YOU ARE A PASSENGER IN A CAR FOR AN HOUR WITHOUT A BREAK: 2
HOW LIKELY ARE YOU TO NOD OFF OR FALL ASLEEP WHILE SITTING AND READING: 3
HOW LIKELY ARE YOU TO NOD OFF OR FALL ASLEEP IN A CAR, WHILE STOPPED FOR A FEW MINUTES IN TRAFFIC: 0
HOW LIKELY ARE YOU TO NOD OFF OR FALL ASLEEP WHILE SITTING AND TALKING TO SOMEONE: 0
HOW LIKELY ARE YOU TO NOD OFF OR FALL ASLEEP WHILE SITTING QUIETLY AFTER LUNCH WITHOUT ALCOHOL: 2
ESS TOTAL SCORE: 10
HOW LIKELY ARE YOU TO NOD OFF OR FALL ASLEEP WHILE WATCHING TV: 3

## 2020-01-06 NOTE — PROGRESS NOTES
Anne-Marie Dick MD, FAASM, [facility]  Brenda Tineo, MSN, RN, CNP     [address] [place] [address] SLEEP MEDICINE  [address] [address]  Dept: [phone]  Dept Fax: : Bryanna Brumfield [facility] SLEEP MEDICINE  [address] [place] [id number] [phone]    Subjective:     Patient ID: Kellee Greene is a 53 y.o. female. Chief Complaint   Patient presents with    Sleep Apnea       HPI:      Machine Present today:  Yes    Machine Modem/Download Info:  Compliance (hours/night): 8.7 hrs/night  Download AHI (/hour): 1.5 /HR  Average CPAP Pressure : 7.5 cmH2O           APAP - Settings  Pressure Min: 6 cmH2O  Pressure Max: 16 cmH2O                 Comfort Settings  Humidity Level (0-8): 4  Flex/EPR (0-3): 3 PAP Mask  Mask Type: Nasal mask     [place] - Total score: 10    Follow-up :     Last Visit : January 2019      Patient reports the listed chronic Co-morbidities: GERD, Obesity, COPD   are well controlled and stable at this time. Subjective Health Changes: None     Over Night Oximetry: [] Yes  [] No  [x] NA [] WNL   Using O2: [] Yes  [] No  [x] NA   Patient is compliant with the machine  [x] Yes  [] No   Feeling rested when using the machine   [x] Yes  [] No     Pressure is comfortable with inspiration and expiration  [x] Yes  [] No     Noticed changes in pressure   [] Yes  [] No  [x] NA   Mask is fitting well  [x] Yes  [] No   Noting Mask Air Leak  [] Yes  [x] No   Having painful Aerophagia  [] Yes  [x] No   Nocturia   0  per night.    Having  HA upon waking  [x] Yes  [] No   Dry mouth upon waking   Dry Nose  Dry Eyes  [] Yes  [x] No   Congestion upon waking   [] Yes  [x] No    Nose Bleeds  [] Yes  [x] No   Using Sleep Aides    [x] NA   Understands how to change humidification and/or tubing temperature for comfort while at home  [x] Yes  [] No     Difficulties falling asleep  [] Yes  [x]

## 2020-01-06 NOTE — PROGRESS NOTES
related to mild COPD of many years duration. She has mild associated cough. Exertion is her main modifying factor. aNo recent flare ups. No Chest pain, Nausea or vomiting reported        Review of Systems  As documented in HPI     Physical Exam:  Well developed, well nourished  Alert and oriented  Sclera is clear  No cervical adenopathy  No JVD. Chest examination is clear. Cardiac examination reveals regular rate and rhythm without murmur, gallop or rub. The abdomen is soft, nontender and nondistended. There is no clubbing, cyanosis or edema of the extremities. There is no obvious skin rash. No focal neuro deficicts  Normal mood and affect    Allergies   Allergen Reactions    Penicillins Anaphylaxis and Rash    Codeine      Prior to Visit Medications    Medication Sig Taking? Authorizing Provider   pregabalin (LYRICA) 150 MG capsule Take 150 mg by mouth 2 times daily. Historical Provider, MD   ipratropium-albuterol (DUONEB) 0.5-2.5 (3) MG/3ML SOLN nebulizer solution INHALE 3 MLS INTO THE LUNGS EVERY 4 to 6  HOURS prn Wheezing.   Serge Cogan, MD   aclidinium (TUDORZA PRESSAIR) 400 MCG/ACT AEPB inhaler Inhale 1 puff into the lungs 2 times daily  Serge Cogan, MD   albuterol-ipratropium (COMBIVENT RESPIMAT)  MCG/ACT AERS inhaler Inhale 1 puff into the lungs every 6 hours  Serge Cogan, MD   albuterol sulfate  (90 Base) MCG/ACT inhaler Inhale 2 puffs into the lungs every 6 hours as needed for Shortness of Breath  Serge Cogan, MD   omeprazole (PRILOSEC) 40 MG delayed release capsule TAKE 1 CAPSULE BY TWICE A DAY  Serge Cogan, MD   montelukast (SINGULAIR) 10 MG tablet TAKE 1 TABLET DAILY  Serge Cogan, MD   empagliflozin (JARDIANCE) 10 MG tablet Take 10 mg by mouth daily  Historical Provider, MD   insulin glargine (TOUJEO SOLOSTAR) 300 UNIT/ML injection pen Inject 20 Units into the skin 2 times daily  Historical Provider, MD   Dulaglutide (TRULICITY) 9.53 ZD/1.1FA SOPN Inject 0.05 mLs into the skin once a week  Historical Provider, MD   aspirin 81 MG tablet Take 81 mg by mouth daily  Historical Provider, MD   atorvastatin (LIPITOR) 10 MG tablet Take 10 mg by mouth daily  Historical Provider, MD   clotrimazole-betamethasone (LOTRISONE) 1-0.05 % cream Apply 1 % topically 2 times daily  Historical Provider, MD   SYNTHROID 100 MCG tablet Take 1 tablet by mouth daily  Historical Provider, MD   nystatin (MYCOSTATIN) 208594 UNIT/ML suspension Take 500,000 Units by mouth 4 times daily  Historical Provider, MD   cyanocobalamin 1000 MCG/ML injection Inject 1,000 mcg into the muscle every 7 days. Historical Provider, MD   LORazepam (ATIVAN) 0.5 MG tablet Take 0.5 mg by mouth every 6 hours as needed. Historical Provider, MD       Vitals:    20 1504   BP: 125/80   Pulse: 89   SpO2: 98%   Weight: 208 lb (94.3 kg)     Body mass index is 35.7 kg/m².      Wt Readings from Last 3 Encounters:   20 208 lb (94.3 kg)   20 209 lb (94.8 kg)   19 204 lb (92.5 kg)     BP Readings from Last 3 Encounters:   20 125/80   20 126/80   19 135/85        Social History     Tobacco Use   Smoking Status Former Smoker    Packs/day: 1.00    Years: 32.00    Pack years: 32.00    Types: Cigarettes    Last attempt to quit: 2012    Years since quittin.0   Smokeless Tobacco Never Used

## 2020-03-09 ENCOUNTER — TELEPHONE (OUTPATIENT)
Dept: PULMONOLOGY | Age: 55
End: 2020-03-09

## 2020-03-09 RX ORDER — UMECLIDINIUM 62.5 UG/1
1 AEROSOL, POWDER ORAL DAILY
Qty: 3 EACH | Refills: 3 | Status: SHIPPED | OUTPATIENT
Start: 2020-03-09 | End: 2021-04-26

## 2020-03-09 NOTE — TELEPHONE ENCOUNTER
Pt is in need of Incruse to be sent to [facility] because we cannot get [place] approved.  Per Dr Mena Murphy script sent and pt aware

## 2020-06-09 ENCOUNTER — VIRTUAL VISIT (OUTPATIENT)
Dept: PULMONOLOGY | Age: 55
End: 2020-06-09
Payer: COMMERCIAL

## 2020-06-09 PROCEDURE — 99214 OFFICE O/P EST MOD 30 MIN: CPT | Performed by: INTERNAL MEDICINE

## 2020-06-09 NOTE — PROGRESS NOTES
[facility]  Progress Note  Asha Aldana MD    Pulmonology Video Visit    Pursuant to the emergency declaration under the [facility] authority and the Coronavirus Preparedness and Response Supplemental Appropriations Act this Video Visit was insisted, with patient's consent, to reduce the patient's risk of exposure to COVID-19 and provide continuity of care for an established patient. The patient was at home, while the provider was at the clinic. Services were provided through a synchronous discussion through a Video Visit to substitute for in-person clinic visit, and coded as such. Liya Go   YOB: 1965    Date of Visit:  6/9/2020    Assessment/Plan:  1. SOB (shortness of breath)  Stable    2. COPD, mild ([address]  PFT 10/15:  Spirometry reveals normal FVC and FEV-1. FEV-1/FVC ratio is reduced. Lung  volumes reveal normal total lung capacity. Vital capacity is increased. Residual volume is decreased. Diffusion capacity is mildly decreased at 74%  of predicted. In comparison to a study from August of 2013, spirometry is  similar.       IMPRESSION: Mild obstructive lung disease. PFT 8/18:  INTERPRETATION:  Spirometry attempts were acceptable and reproducible. FVC  was normal at 3.17 liters, 91% predicted and normal FEV1 2.46 liters, 90%  predicted. FEV1/FVC ratio was normal.  Lung volumes showed normal total  lung capacity of 90% predicted. Diffusion capacity showed decreased DLCO  of 70% predicted.     IMPRESSION:  Normal spirometry and lung volumes with mild decrease in  diffusion capacity. In comparison to the test that was done in 10/2015,  FEV1 has improved by 9%. Advair==> gave her thrush  Tudorza BID ==> Incruse  Duoneb/Combivent prn. And in advance of activity    LDCT chest 11/19 was ok.     3. Obstructive sleep apnea (adult) (pediatric)  CPAP settings: Auto CPAP mode Pmin-6 Pmax-16 Flex-3 Hum-3 Average P- 7.6 Comp 7.5 hrs/night Download AHI - 2.1/hr Mask- Nasal Pillows Machine download/SD card data reviewed and documented. Excellent treatment effect  Follows with Dr Kenny Atkinson    4. 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 Gastroesophageal reflux disease without esophagitis  Omeprazole 40 mg bid helps     6. Former smoker  LDCT chest 11/19 is clear  Next scan around 11/20      FOLLOW UP: 6 months      Chief Complaint   Patient presents with    Shortness of Breath       HPI  The patient presents with a chief complaint of mild shortness of breath related to mild COPD of many years duration. She has mild associated cough. Exertion is her main modifying factor. No recent flare ups. Has some allergy symptoms. No Chest pain, Nausea or vomiting reported        Review of Systems  As documented in HPI     Physical Exam:    Video visit    Allergies   Allergen Reactions    Penicillins Anaphylaxis and Rash    Codeine      Prior to Visit Medications    Medication Sig Taking? Authorizing Provider   Umeclidinium Bromide (INCRUSE ELLIPTA) 62.5 MCG/INH AEPB Inhale 1 puff into the lungs daily  Rosalio Granados MD   pregabalin (LYRICA) 150 MG capsule Take 150 mg by mouth 2 times daily. Historical Provider, MD   ipratropium-albuterol (DUONEB) 0.5-2.5 (3) MG/3ML SOLN nebulizer solution INHALE 3 MLS INTO THE LUNGS EVERY 4 to 6  HOURS prn Wheezing.   Rosalio Granados MD   albuterol-ipratropium (COMBIVENT RESPIMAT)  MCG/ACT AERS inhaler Inhale 1 puff into the lungs every 6 hours  Rosalio Granados MD   albuterol sulfate  (90 Base) MCG/ACT inhaler Inhale 2 puffs into the lungs every 6 hours as needed for Shortness of Breath  Rosalio Granados MD   omeprazole (PRILOSEC) 40 MG delayed release capsule TAKE 1 CAPSULE BY TWICE A DAY  Rosalio Granados MD   montelukast (SINGULAIR) 10 MG tablet TAKE 1 TABLET DAILY  Rosalio Granados MD

## 2020-07-13 RX ORDER — ALBUTEROL SULFATE 90 UG/1
AEROSOL, METERED RESPIRATORY (INHALATION)
Qty: 25.5 G | Refills: 3 | Status: SHIPPED | OUTPATIENT
Start: 2020-07-13

## 2020-07-29 ENCOUNTER — TELEPHONE (OUTPATIENT)
Dept: PULMONOLOGY | Age: 55
End: 2020-07-29

## 2020-07-29 NOTE — TELEPHONE ENCOUNTER
There are no data showing hydroxychloroquine to be effective. It is not recommended. As far as going out is concerned, would recommend the use of wearing a mask and social distancing. Would not recommend going to any indoor facilities such as restaurants or bars.   Still recommend limiting nonessential trips

## 2020-11-16 ENCOUNTER — TELEPHONE (OUTPATIENT)
Dept: PULMONOLOGY | Age: 55
End: 2020-11-16

## 2020-11-16 NOTE — TELEPHONE ENCOUNTER
[facility] called and pt is trying to schedule a ct, no order is in. Her last office visit note said \"Next scan around 11/20\".

## 2020-11-27 ENCOUNTER — HOSPITAL ENCOUNTER (OUTPATIENT)
Dept: CT IMAGING | Age: 55
Discharge: HOME | End: 2020-11-27
Payer: COMMERCIAL

## 2020-11-27 PROCEDURE — G0297 LDCT FOR LUNG CA SCREEN: HCPCS

## 2021-01-04 ENCOUNTER — TELEPHONE (OUTPATIENT)
Dept: PULMONOLOGY | Age: 56
End: 2021-01-04

## 2021-01-19 ENCOUNTER — VIRTUAL VISIT (OUTPATIENT)
Dept: PULMONOLOGY | Age: 56
End: 2021-01-19
Payer: COMMERCIAL

## 2021-01-19 DIAGNOSIS — Z87.891 FORMER SMOKER: ICD-10-CM

## 2021-01-19 DIAGNOSIS — G47.33 OBSTRUCTIVE SLEEP APNEA: Chronic | ICD-10-CM

## 2021-01-19 DIAGNOSIS — K21.9 GASTROESOPHAGEAL REFLUX DISEASE WITHOUT ESOPHAGITIS: Chronic | ICD-10-CM

## 2021-01-19 DIAGNOSIS — J44.9 COPD, MILD (HCC): Chronic | ICD-10-CM

## 2021-01-19 DIAGNOSIS — R06.02 SOB (SHORTNESS OF BREATH): Primary | ICD-10-CM

## 2021-01-19 PROCEDURE — 99213 OFFICE O/P EST LOW 20 MIN: CPT | Performed by: INTERNAL MEDICINE

## 2021-01-19 NOTE — PROGRESS NOTES
[facility]  Progress Note  Alix Cintron MD    Pulmonology Video Visit    Pursuant to the emergency declaration under the [facility], [facility] waiver authority and the Coronavirus Preparedness and Response Supplemental Appropriations Act this Video Visit was insisted, with patient's consent, to reduce the patient's risk of exposure to COVID-19 and provide continuity of care for an established patient. The patient was at home, while the provider was at the clinic. Services were provided through a synchronous discussion through a Video Visit to substitute for in-person clinic visit, and coded as such. Simi Barbour   YOB: 1965    Date of Visit:  1/19/2021    Assessment/Plan:  1. SOB (shortness of breath)  Stable    2. COPD, mild ([address]  PFT 10/15:  Spirometry reveals normal FVC and FEV-1. FEV-1/FVC ratio is reduced. Lung  volumes reveal normal total lung capacity. Vital capacity is increased. Residual volume is decreased. Diffusion capacity is mildly decreased at 74%  of predicted. In comparison to a study from August of 2013, spirometry is  similar.       IMPRESSION: Mild obstructive lung disease. PFT 8/18:  INTERPRETATION:  Spirometry attempts were acceptable and reproducible. FVC  was normal at 3.17 liters, 91% predicted and normal FEV1 2.46 liters, 90%  predicted. FEV1/FVC ratio was normal.  Lung volumes showed normal total  lung capacity of 90% predicted. Diffusion capacity showed decreased DLCO  of 70% predicted.     IMPRESSION:  Normal spirometry and lung volumes with mild decrease in  diffusion capacity. In comparison to the test that was done in 10/2015,  FEV1 has improved by 9%. Incruse (Has used Advair and Tudorza)  Duoneb/Combivent prn. in advance of activity    LDCT chest 11/20 was ok.     3. Obstructive sleep apnea (adult) (pediatric)  CPAP settings: Auto CPAP mode Pmin-6 Pmax-16 Flex-3 Hum-3 Average P- 7.6 Comp 7.5 hrs/night Download AHI - 2.1/hr Mask- Nasal Pillows Machine download/SD card data reviewed and documented. Excellent treatment effect  Follows with Dr Angela Acevedo    4. 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 Gastroesophageal reflux disease without esophagitis  Omeprazole 40 mg bid helps     6. Former smoker  Next scan around 11/21    Recommend COVID vaccine      FOLLOW UP: 6 months      Chief Complaint   Patient presents with    Shortness of Breath     had her CT Lung Screen     Cough     sometimes gets up some chunks of white phlegm but not concerned       HPI  The patient presents with a chief complaint of mild shortness of breath related to mild COPD of many years duration. She has mild associated cough. Exertion is her main modifying factor. No recent flare ups. Has some allergy symptoms. Review of Systems  No Chest pain, Nausea or vomiting reported       Physical Exam:    Video visit    Allergies   Allergen Reactions    Penicillins Anaphylaxis and Rash    Codeine      Prior to Visit Medications    Medication Sig Taking? Authorizing Provider   ipratropium-albuterol (DUONEB) 0.5-2.5 (3) MG/3ML SOLN nebulizer solution USE 1 VIAL EVERY 4 TO 6 HOURS AS NEEDED FOR WHEEZING  DX:COPD J44.9  Rosanna Solano MD   albuterol sulfate  (90 Base) MCG/ACT inhaler USE 2 INHALATIONS EVERY 6 HOURS AS NEEDED FOR SHORTNESS OF BREATH  Rosanna Solano MD   omeprazole (PRILOSEC) 40 MG delayed release capsule TAKE 1 CAPSULE TWICE A DAY  Rosanna Solano MD   montelukast (SINGULAIR) 10 MG tablet TAKE 1 TABLET DAILY  Rosanna Solano MD   Umeclidinium Bromide (INCRUSE ELLIPTA) 62.5 MCG/INH AEPB Inhale 1 puff into the lungs daily  Rosanna Solano MD   pregabalin (LYRICA) 150 MG capsule Take 150 mg by mouth 2 times daily.   Historical Provider, MD   albuterol-ipratropium (COMBIVENT RESPIMAT)  MCG/ACT AERS inhaler Inhale 1 puff into the lungs every 6 hours  Jacki Ashby MD   empagliflozin (JARDIANCE) 10 MG tablet Take 10 mg by mouth daily  Historical Provider, MD   insulin glargine (TOUJEO SOLOSTAR) 300 UNIT/ML injection pen Inject 20 Units into the skin 2 times daily  Historical Provider, MD   Dulaglutide (TRULICITY) 5.85 AL/8.0YU SOPN Inject 0.05 mLs into the skin once a week  Historical Provider, MD   aspirin 81 MG tablet Take 81 mg by mouth daily  Historical Provider, MD   atorvastatin (LIPITOR) 10 MG tablet Take 10 mg by mouth daily  Historical Provider, MD   clotrimazole-betamethasone (LOTRISONE) 1-0.05 % cream Apply 1 % topically 2 times daily  Historical Provider, MD   SYNTHROID 100 MCG tablet Take 1 tablet by mouth daily  Historical Provider, MD   nystatin (MYCOSTATIN) 389012 UNIT/ML suspension Take 500,000 Units by mouth 4 times daily  Historical Provider, MD   cyanocobalamin 1000 MCG/ML injection Inject 1,000 mcg into the muscle every 7 days. Historical Provider, MD   LORazepam (ATIVAN) 0.5 MG tablet Take 0.5 mg by mouth every 6 hours as needed. Historical Provider, MD       There were no vitals filed for this visit. There is no height or weight on file to calculate BMI.      Wt Readings from Last 3 Encounters:   20 208 lb (94.3 kg)   20 209 lb (94.8 kg)   19 204 lb (92.5 kg)     BP Readings from Last 3 Encounters:   20 125/80   20 126/80   19 135/85        Social History     Tobacco Use   Smoking Status Former Smoker    Packs/day: 1.00    Years: 32.00    Pack years: 32.00    Types: Cigarettes    Quit date: 2012    Years since quittin.0   Smokeless Tobacco Never Used

## 2021-01-28 ENCOUNTER — VIRTUAL VISIT (OUTPATIENT)
Dept: PULMONOLOGY | Age: 56
End: 2021-01-28
Payer: COMMERCIAL

## 2021-01-28 DIAGNOSIS — G47.33 OBSTRUCTIVE SLEEP APNEA: Primary | Chronic | ICD-10-CM

## 2021-01-28 DIAGNOSIS — J44.9 CHRONIC OBSTRUCTIVE PULMONARY DISEASE, UNSPECIFIED COPD TYPE (HCC): Chronic | ICD-10-CM

## 2021-01-28 DIAGNOSIS — E66.2 CLASS 1 OBESITY WITH ALVEOLAR HYPOVENTILATION WITHOUT SERIOUS COMORBIDITY WITH BODY MASS INDEX (BMI) OF 34.0 TO 34.9 IN ADULT (HCC): ICD-10-CM

## 2021-01-28 DIAGNOSIS — K21.9 GASTROESOPHAGEAL REFLUX DISEASE WITHOUT ESOPHAGITIS: Chronic | ICD-10-CM

## 2021-01-28 PROCEDURE — 99214 OFFICE O/P EST MOD 30 MIN: CPT | Performed by: NURSE PRACTITIONER

## 2021-01-28 ASSESSMENT — SLEEP AND FATIGUE QUESTIONNAIRES
ESS TOTAL SCORE: 11
HOW LIKELY ARE YOU TO NOD OFF OR FALL ASLEEP WHILE SITTING AND TALKING TO SOMEONE: 0
HOW LIKELY ARE YOU TO NOD OFF OR FALL ASLEEP WHILE SITTING QUIETLY AFTER LUNCH WITHOUT ALCOHOL: 3
HOW LIKELY ARE YOU TO NOD OFF OR FALL ASLEEP WHILE WATCHING TV: 2
HOW LIKELY ARE YOU TO NOD OFF OR FALL ASLEEP WHILE LYING DOWN TO REST IN THE AFTERNOON WHEN CIRCUMSTANCES PERMIT: 1

## 2021-01-28 NOTE — PROGRESS NOTES
Leonardo Santiago MD, FAASM, [facility]  Eli Zaragoza, MSN, RN, CNP     [address] [facility]  [place] [address] [address] [place] [address]  Dept: [phone]  Dept Fax: [phone]: [facility]  [address] [place] [address] [phone]    Subjective:     Patient ID: Vandana Cotton is a 63 y.o. female. Chief Complaint   Patient presents with    Sleep Apnea       HPI:      Sleep Medicine Video Visit    Pursuant to the emergency declaration under the [address] [place] [address], [facility] waiver authority and the [employer] Act this Telephone Visit was insisted, with patient's consent, to reduce the patient's risk of exposure to COVID-19 and provide continuity of care for an established patient. Services were provided through a synchronous discussion over a telephone and/or Video chat to substitute for in-person clinic visit, and coded as such. While patient is at home. Machine Modem/Download Info:  Compliance (hours/night): 8.2 hrs/night  Download AHI (/hour): 2.5 /HR  Average CPAP Pressure : 7.6 cmH2O           APAP - Settings  Pressure Min: 6 cmH2O  Pressure Max: 16 cmH2O                 Comfort Settings  Humidity Level (0-8): 3  Flex/EPR (0-3): 3 PAP Mask  Mask Type: Nasal mask  Clinically Relevant Leak: No     [place] - Total score: 11    Follow-up :     Last Visit : January 2020      Patient reports the listed chronic Co-morbidities: GERD, COPD, Obesity   are well controlled and stable at this time.      Subjective Health Changes: None      Over Night Oximetry: [] Yes  [] No  [x] NA [] WNL   Using O2: [] Yes  [] No  [x] NA   Patient is compliant with the machine  [x] Yes  [] No   Feeling rested when using the machine   [x] Yes  [] No Chronic-Stable. Encouraged her to work on weight loss through diet and exercise. COPD (chronic obstructive pulmonary disease) ([facility]  Reviewed compliance download with pt. Supplies and parts as needed for her machine. These are medically necessary. Continue medications per her PCP and other physicians. Limit caffeine use after 3pm.  Encouraged her to work on weight loss through diet and exercise. Diagnoses of Gastroesophageal reflux disease without esophagitis and Class 1 obesity with alveolar hypoventilation without serious comorbidity with body mass index (BMI) of 34.0 to 34.9 in [facility]) were pertinent to this visit. The chronic medical conditions listed are directly related to the primary diagnosis listed above. The management of the primary diagnosis affects the secondary diagnosis and vice versa. Obstructive sleep apnea  Reviewed compliance download with pt. Supplies and parts as needed for her machine. These are medically necessary. Continue medications per her PCP and other physicians. Limit caffeine use after 3pm.  Encouraged her to work on weight loss through diet and exercise. Diagnoses of Gastroesophageal reflux disease without esophagitis, Class 1 obesity with alveolar hypoventilation without serious comorbidity with body mass index (BMI) of 34.0 to 34.9 in [facility]), and Chronic obstructive pulmonary disease, unspecified COPD type ([facility] were pertinent to this visit. The chronic medical conditions listed are directly related to the primary diagnosis listed above. The management of the primary diagnosis affects the secondary diagnosis and vice versa.

## 2021-01-28 NOTE — PROGRESS NOTES
Robby Schneider         : 1965    Diagnosis: [x] DAI (G47.33) [] CSA (G47.31) [] Apnea (G47.30)   Length of Need: [x] 12 Months [] 99 Months [] Other:    Machine (LIZY!): [x] Respironics Dream Station      Auto [] ResMed AirSense     Auto [] Other:     []  CPAP () [] Bilevel ()   Mode: [] Auto [] Spontaneous    Mode: [] Auto [] Spontaneous                            Comfort Settings:   - Ramp Pressure:  cmH2O                                        - Ramp time: 15 min                                     -  Flex/EPR - 3 full time                                    - For ResMed Bilevel (TiMax-4 sec   TiMin- 0.2 sec)     Humidifier: [x] Heated ()        [x] Water chamber replacement ()/ 1 per 6 months        Mask:   [x] Nasal () /1 per 3 months [] Full Face () /1 per 3 months   [x] Patient choice -Size and fit mask [] Patient Choice - Size and fit mask   [] Dispense:  [] Dispense:    [x] Headgear () / 1 per 3 months [] Headgear () / 1 per 3 months   [x] Replacement Nasal Cushion ()/2 per month [] Interface Replacement ()/1 per month   [] Replacement Nasal Pillows ()/2 per month         Tubing: [x] Heated ()/1 per 3 months    [] Standard ()/1 per 3 months [] Other:           Filters: [x] Non-disposable ()/1 per 6 months     [x] Ultra-Fine, Disposable ()/2 per month        Miscellaneous: [] Chin Strap ()/ 1 per 6 months [] O2 bleed-in:       LPM   [] Oximetry on CPAP/Bilevel []  Other:    [x] Modem: ()         Start Order Date: 21    MEDICAL JUSTIFICATION:  I, the undersigned, certify that the above prescribed supplies are medically necessary for this patients wellbeing. In my opinion, the supplies are both reasonable and necessary in reference to accepted standards of medicalpractice in treatment of this patients condition.     Dianna Alvarado APRN - CHARI      NPI: [id number]       Order Signed Date: 21    Electronically signed by MELVA Lomas CNP on 2021 at 10:22 AM    Madelynumu Wharton  1965  [address] [place]   [phone] (home)   [phone] (mobile)      Insurance Info (confirm with patient if correct):  Payor/Plan Subscr  Sex Relation Sub.  Ins. ID Effective Group Num

## 2021-01-28 NOTE — ASSESSMENT & PLAN NOTE
Reviewed compliance download with pt. Supplies and parts as needed for her machine. These are medically necessary. Continue medications per her PCP and other physicians. Limit caffeine use after 3pm.  Encouraged her to work on weight loss through diet and exercise. Diagnoses of Gastroesophageal reflux disease without esophagitis and Class 1 obesity with alveolar hypoventilation without serious comorbidity with body mass index (BMI) of 34.0 to 34.9 in [facility]) were pertinent to this visit. The chronic medical conditions listed are directly related to the primary diagnosis listed above. The management of the primary diagnosis affects the secondary diagnosis and vice versa.

## 2021-01-28 NOTE — ASSESSMENT & PLAN NOTE
Reviewed compliance download with pt. Supplies and parts as needed for her machine. These are medically necessary. Continue medications per her PCP and other physicians. Limit caffeine use after 3pm.  Encouraged her to work on weight loss through diet and exercise. Diagnoses of Gastroesophageal reflux disease without esophagitis, Class 1 obesity with alveolar hypoventilation without serious comorbidity with body mass index (BMI) of 34.0 to 34.9 in [facility]), and Chronic obstructive pulmonary disease, unspecified COPD type ([facility] were pertinent to this visit. The chronic medical conditions listed are directly related to the primary diagnosis listed above. The management of the primary diagnosis affects the secondary diagnosis and vice versa.

## 2021-04-12 RX ORDER — IPRATROPIUM BROMIDE AND ALBUTEROL SULFATE 2.5; .5 MG/3ML; MG/3ML
1 SOLUTION RESPIRATORY (INHALATION) EVERY 4 HOURS
Qty: 1080 ML | Refills: 3 | Status: SHIPPED | OUTPATIENT
Start: 2021-04-12 | End: 2022-04-05 | Stop reason: SDUPTHER

## 2021-04-13 ENCOUNTER — TELEPHONE (OUTPATIENT)
Dept: PULMONOLOGY | Age: 56
End: 2021-04-13

## 2021-04-14 NOTE — TELEPHONE ENCOUNTER
[employer] left message about ipratropium-albuterol  ipratropium-albuterol (DUONEB) 0.5-2.5 (3) MG/3ML SOLN nebulizer solution  Needs therapy clarify.    Call back at [phone] and   ref #[id number]

## 2021-04-26 RX ORDER — UMECLIDINIUM 62.5 UG/1
AEROSOL, POWDER ORAL
Qty: 90 EACH | Refills: 3 | Status: SHIPPED | OUTPATIENT
Start: 2021-04-26 | End: 2022-04-05

## 2021-05-17 ENCOUNTER — TELEPHONE (OUTPATIENT)
Dept: PULMONOLOGY | Age: 56
End: 2021-05-17

## 2021-05-17 DIAGNOSIS — R07.1 CHEST PAIN ON BREATHING: Primary | ICD-10-CM

## 2021-05-17 NOTE — TELEPHONE ENCOUNTER
Has Right pain in her chest been occurring for a while now Offered appointment and pt stated couldn't come till Wednesday Appointment made with Dr Ebonie Otero and CXR order placed for her to have done prior to her appt per Dr Hortencia Mckoy.

## 2021-05-19 ENCOUNTER — HOSPITAL ENCOUNTER (OUTPATIENT)
Age: 56
Discharge: HOME | End: 2021-05-19
Payer: COMMERCIAL

## 2021-05-19 ENCOUNTER — HOSPITAL ENCOUNTER (OUTPATIENT)
Dept: GENERAL RADIOLOGY | Age: 56
Discharge: HOME | End: 2021-05-19
Payer: COMMERCIAL

## 2021-05-19 DIAGNOSIS — R07.1 CHEST PAIN ON BREATHING: ICD-10-CM

## 2021-05-19 PROCEDURE — 71046 X-RAY EXAM CHEST 2 VIEWS: CPT

## 2021-05-25 RX ORDER — MONTELUKAST SODIUM 10 MG/1
TABLET ORAL
Qty: 90 TABLET | Refills: 3 | Status: SHIPPED | OUTPATIENT
Start: 2021-05-25

## 2021-07-01 ENCOUNTER — VIRTUAL VISIT (OUTPATIENT)
Dept: PULMONOLOGY | Age: 56
End: 2021-07-01
Payer: COMMERCIAL

## 2021-07-01 DIAGNOSIS — J42 CHRONIC BRONCHITIS, UNSPECIFIED CHRONIC BRONCHITIS TYPE (HCC): Chronic | ICD-10-CM

## 2021-07-01 DIAGNOSIS — E66.2 CLASS 1 OBESITY WITH ALVEOLAR HYPOVENTILATION WITHOUT SERIOUS COMORBIDITY WITH BODY MASS INDEX (BMI) OF 34.0 TO 34.9 IN ADULT (HCC): ICD-10-CM

## 2021-07-01 DIAGNOSIS — G47.33 OBSTRUCTIVE SLEEP APNEA: Primary | Chronic | ICD-10-CM

## 2021-07-01 DIAGNOSIS — K21.00 GASTROESOPHAGEAL REFLUX DISEASE WITH ESOPHAGITIS WITHOUT HEMORRHAGE: Chronic | ICD-10-CM

## 2021-07-01 PROCEDURE — 99214 OFFICE O/P EST MOD 30 MIN: CPT | Performed by: NURSE PRACTITIONER

## 2021-07-01 ASSESSMENT — SLEEP AND FATIGUE QUESTIONNAIRES
HOW LIKELY ARE YOU TO NOD OFF OR FALL ASLEEP WHILE SITTING INACTIVE IN A PUBLIC PLACE: 0
ESS TOTAL SCORE: 5
HOW LIKELY ARE YOU TO NOD OFF OR FALL ASLEEP WHILE WATCHING TV: 1
HOW LIKELY ARE YOU TO NOD OFF OR FALL ASLEEP WHILE SITTING QUIETLY AFTER LUNCH WITHOUT ALCOHOL: 1
HOW LIKELY ARE YOU TO NOD OFF OR FALL ASLEEP IN A CAR, WHILE STOPPED FOR A FEW MINUTES IN TRAFFIC: 0
HOW LIKELY ARE YOU TO NOD OFF OR FALL ASLEEP WHEN YOU ARE A PASSENGER IN A CAR FOR AN HOUR WITHOUT A BREAK: 1
HOW LIKELY ARE YOU TO NOD OFF OR FALL ASLEEP WHILE SITTING AND READING: 1
HOW LIKELY ARE YOU TO NOD OFF OR FALL ASLEEP WHILE SITTING AND TALKING TO SOMEONE: 0
HOW LIKELY ARE YOU TO NOD OFF OR FALL ASLEEP WHILE LYING DOWN TO REST IN THE AFTERNOON WHEN CIRCUMSTANCES PERMIT: 1

## 2021-07-01 NOTE — PROGRESS NOTES
Sandra Reynolds         : 1965    Diagnosis: [x] DAI (G47.33) [] CSA (G47.31) [] Apnea (G47.30)   Length of Need: [x] 12 Months [] 99 Months [] Other:    Machine (LIZY!): [] Respironics Dream Station   2   Auto [] ResMed AirSense     Auto [] Other:     []  CPAP () [] Bilevel ()   Mode: [] Auto [] Spontaneous    Mode: [] Auto [] Spontaneous                            Comfort Settings:   - Ramp Pressure:  cmH2O                                        - Ramp time: 15 min                                     -  Flex/EPR - 3 full time                                    - For ResMed Bilevel (TiMax-4 sec   TiMin- 0.2 sec)     Humidifier: [x] Heated ()        [x] Water chamber replacement ()/ 1 per 6 months        Mask:   [x] Nasal () /1 per 3 months [] Full Face () /1 per 3 months   [x] Patient choice -Size and fit mask [] Patient Choice - Size and fit mask   [] Dispense:  [] Dispense:    [x] Headgear () / 1 per 3 months [] Headgear () / 1 per 3 months   [x] Replacement Nasal Cushion ()/2 per month [] Interface Replacement ()/1 per month   [] Replacement Nasal Pillows ()/2 per month         Tubing: [x] Heated ()/1 per 3 months    [] Standard ()/1 per 3 months [] Other:           Filters: [x] Non-disposable ()/1 per 6 months     [x] Ultra-Fine, Disposable ()/2 per month        Miscellaneous: [] Chin Strap ()/ 1 per 6 months [] O2 bleed-in:       LPM   [] Oximetry on CPAP/Bilevel []  Other:    [x] Modem: ()         Start Order Date: 21    MEDICAL JUSTIFICATION:  I, the undersigned, certify that the above prescribed supplies are medically necessary for this patients wellbeing. In my opinion, the supplies are both reasonable and necessary in reference to accepted standards of medicalpractice in treatment of this patients condition.     MELVA Casarez - CNP      NPI: [id number]       Order Signed Date:

## 2021-07-01 NOTE — PROGRESS NOTES
Bree Franco MD, FAASM, [facility]  Tank Gardner, MSN, RN, CNP     [address] [facility] SLE[facility]IC[facility]  [place] [address] [address] [place] [address]  Dept: [phone]  Dept Fax: [phone]  Loc: [phone]    Subjective:     Patient ID: Azul May is a 63 y.o. female. Chief Complaint   Patient presents with    Sleep Apnea       HPI:      Sleep Medicine Video Visit    Pursuant to the emergency declaration under the [address] [place] [address] waiver authority and the [employer] Act this Telephone Visit was insisted, with patient's consent, to reduce the patient's risk of exposure to COVID-19 and provide continuity of care for an established patient. Services were provided through a synchronous discussion over a telephone and/or Video chat to substitute for in-person clinic visit, and coded as such. While patient is at home.     Machine Modem/Download Info:  Compliance (hours/night): 9 hrs/night  Download AHI (/hour): 2.3 /HR  Average CPAP Pressure : 7.6 cmH2O           APAP - Settings  Pressure Min: 6 cmH2O  Pressure Max: 16 cmH2O                 Comfort Settings  Humidity Level (0-8): 4  Flex/EPR (0-3): 3 PAP Mask  Mask Type: Nasal mask  Clinically Relevant Leak: No     [facility] - Total score: 5    Follow-up :     Last Visit : January 2021      Subjective Health Changes: mold in unit     Over Night Oximetry: [] Yes  [] No  [x] NA [] WNL   Using O2: [] Yes  [] No  [x] NA   Patient is compliant with the machine  [x] Yes  [] No [] Per patient   Feeling rested when using the machine   [x] Yes  [] No     Pressure is comfortable with inspiration and expiration  [x] Yes  [] No   ([x] NA   [] Feeling of suffocation  [] Feeling like not enough air    [] To much pressure)     Noticed changes in pressure  [x] NA  [] Yes    [] No     Mask is fitting well  [x] Yes  [] No   Noting Mask Air Leak  [] Yes  [x] No   Having painful Aerophagia  [] Yes  [x] No   Nocturia   0  per night. Having  HA upon waking  [] Yes  [x] No   Dry mouth upon waking   Dry Nose  Dry Eyes  [] Yes  [x] No   Congestion upon waking   [] Yes  [x] No    Nose Bleeds  [] Yes  [x] No   Using Sleep Aides  [x] NA  [] OTC  [] Per our office   [] Per another provider   Understands how to change humidification and/or tubing temperature for comfort while at home  [x] Yes  [] No     Difficulties falling asleep  [] Yes  [x] No   Difficulties staying asleep  [] Yes  [x] No   Approximate time to bed  9pm-12am   Approximate wake time  7-10am   Taking Naps  no   If taking naps usual length [x] NA   If taking naps using the machine [x] NA  [] Yes    [] No    [] With and With out    Drowsy when driving  [] Yes  [x] No     Does patient carry a DOT/CDL  [] Yes  [x] No     Does patient carry FAA/Pilots License   [] Yes  [x] No      Any concerns noted with the machine at this time  [x] Yes  [] No  Patient ordered a new machine:   Concerns with the machine:     Mold in the lid  Increased symptoms include but are not limited to   Concerns with health    Machine is greater then 11years old    Yes    Patient was benefiting from use prior to the start of the malfunctioning of the machine. Assessment/Plan:   1. Obstructive sleep apnea  Assessment & Plan:  After downloading data and reviewing  Reviewed compliance download with pt. Supplies and parts as needed for his machine. These are medically necessary. Continue medications per his PCP and other physicians. Limit caffeine use after 3pm.    The chronic medical conditions listed are directly related to the primary diagnosis listed above. The management of the primary diagnosis affects the secondary diagnosis and vice versa    Patient is complaint encouraged to maintain compliance to aide on controlling other stated healthcare concerns.        2. Gastroesophageal reflux disease with esophagitis without hemorrhage  Assessment & Plan:  Chronic- stable. After speaking with patient:    Agree with current plan, and would agree to continue this plan per prescribing and managing physician. 3. Class 1 obesity with alveolar hypoventilation without serious comorbidity with body mass index (BMI) of 34.0 to 34.9 in adult [facility])  Assessment & Plan:  Patient encouraged to work on maintaining a healthy weight per height. Achievable with diet restriction/modifications and exercise (may consult primary care if unsure of any restrictions or concerns). Weight management directly correlates to risk of control and maintenance of Obstructive Sleep Apnea. 4. Chronic bronchitis, unspecified chronic bronchitis type ([facility] [address]  Assessment & Plan:  Chronic- stable. After speaking with patient:    Agree with current plan, and would agree to continue this plan per prescribing and managing physician. - After pulling data and reviewing it   - Reviewed compliance download with patient    -Medically necessary supplies and parts as needed for her machine.   - Continue medications per his primary care provider and other physicians.   - Encouraged to limit caffeine use after 3pm.    -  Encouraged her to work on weight loss through diet and exercise  - Educated not to drive when feeling sleepy   - Patient using [employer]  - Obtaining a new machine   Office locations  Compliance  Follow up  After speaking to the patient, patient is currently stable. We will continue with the current machine settings  Recall Information and DME contact     The chronic medical conditions listed are directly related to the primary diagnosis listed above. The management of the primary diagnosis affects the secondary diagnosis and vice versa.     - Will follow up in off in 3 months    Electronically signed by  Genie Alejo, MSN, RN, CNP on 7/1/2021 at 11:55 AM

## 2021-07-01 NOTE — PROGRESS NOTES
Shalonda Stephens         : 1965    Diagnosis: [x] DAI (G47.33) [] CSA (G47.31) [] Apnea (G47.30)   Length of Need: [x] 12 Months [] 99 Months [] Other:    Machine (LIZY!): [] Respironics Dream Station   2   Auto [x] ResMed AirSense     Auto [] Other:     [x]  CPAP () [] Bilevel ()   Mode: [x] Auto [] Spontaneous    Mode: [] Auto [] Spontaneous        APAP - Settings  Pressure Min: 6 cmH2O  Pressure Max: 16 cmH2O                   Comfort Settings:   - Ramp Pressure: 4 cmH2O                                        - Ramp time: 15 min                                     -  Flex/EPR - 3 full time                                    - For ResMed Bilevel (TiMax-4 sec   TiMin- 0.2 sec)     Humidifier: [x] Heated ()        [x] Water chamber replacement ()/ 1 per 6 months        Mask:   [] Nasal () /1 per 3 months [] Full Face () /1 per 3 months   [] Patient choice -Size and fit mask [] Patient Choice - Size and fit mask   [] Dispense:  [] Dispense:    [] Headgear () / 1 per 3 months [] Headgear () / 1 per 3 months   [] Replacement Nasal Cushion ()/2 per month [] Interface Replacement ()/1 per month   [] Replacement Nasal Pillows ()/2 per month         Tubing: [x] Heated ()/1 per 3 months    [] Standard ()/1 per 3 months [] Other:           Filters: [x] Non-disposable ()/1 per 6 months     [x] Ultra-Fine, Disposable ()/2 per month        Miscellaneous: [] Chin Strap ()/ 1 per 6 months [] O2 bleed-in:       LPM   [] Oximetry on CPAP/Bilevel []  Other:    [x] Modem: ()         Start Order Date: 21    MEDICAL JUSTIFICATION:  I, the undersigned, certify that the above prescribed supplies are medically necessary for this patients wellbeing. In my opinion, the supplies are both reasonable and necessary in reference to accepted standards of medicalpractice in treatment of this patients condition.     Lisset Gomez, APRN - CNP NPI: [id number]       Order Signed Date: 21    Electronically signed by MELVA Mancuso CNP on 2021 at 11:55 AM    Ibeth Fontaine  1965  [address] [place] [address]  [phone] (home)   [phone] (mobile)      Insurance Info (confirm with patient if correct):  Payor/Plan Subscr  Sex Relation Sub.  Ins. ID Effective Group Num

## 2021-07-06 RX ORDER — OMEPRAZOLE 40 MG/1
CAPSULE, DELAYED RELEASE ORAL
Qty: 180 CAPSULE | Refills: 3 | Status: SHIPPED | OUTPATIENT
Start: 2021-07-06

## 2021-07-08 ENCOUNTER — OFFICE VISIT (OUTPATIENT)
Dept: PULMONOLOGY | Age: 56
End: 2021-07-08
Payer: COMMERCIAL

## 2021-07-08 VITALS
DIASTOLIC BLOOD PRESSURE: 78 MMHG | HEART RATE: 86 BPM | HEIGHT: 64 IN | WEIGHT: 214 LBS | BODY MASS INDEX: 36.54 KG/M2 | OXYGEN SATURATION: 94 % | SYSTOLIC BLOOD PRESSURE: 122 MMHG

## 2021-07-08 DIAGNOSIS — R06.02 SHORTNESS OF BREATH: ICD-10-CM

## 2021-07-08 DIAGNOSIS — K21.9 GERD WITHOUT ESOPHAGITIS: ICD-10-CM

## 2021-07-08 DIAGNOSIS — J44.9 COPD, MILD (HCC): Primary | ICD-10-CM

## 2021-07-08 PROCEDURE — 99213 OFFICE O/P EST LOW 20 MIN: CPT | Performed by: NURSE PRACTITIONER

## 2021-07-08 ASSESSMENT — ENCOUNTER SYMPTOMS
SHORTNESS OF BREATH: 1
COUGH: 1
COLOR CHANGE: 0
CONSTIPATION: 0
ABDOMINAL PAIN: 0

## 2021-07-08 NOTE — PROGRESS NOTES
[facility] Follow Up Note    Subjective:   CHIEF COMPLAINT / HPI: mild COPD, DAI   The patient is 63 y.o. female who presents today for a routine follow up visit related to the above mentioned issues. There is a PMH significant for other conditions including anxiety, depression and fibromyalgia. She was last evaluated by Dr. Orlando Devries in January. At that time pulmonary symptoms were relatively stable. Presently she reports SOB which seems to be worsening over time. She reports a whistling sound when she lies down at night which bothers her. She tends to feel better when she takes HHN TID and a dose of Combivent along with Incruse but she doesn't like the feeling in her throat. She does cough some everyday. This isn't worse than normal. She denies fevers or chills or recent flares. She does require supplemental O2.          Past Medical History:   Diagnosis Date    Anxiety     B12 deficiency anemia     COPD (chronic obstructive pulmonary disease) (HCC)     Depression     Fatty liver     Fibromyalgia     patient states no treatment    Obstructive sleep apnea     cpap     Social History:    Social History     Tobacco Use   Smoking Status Former Smoker    Packs/day: 1.00    Years: 32.00    Pack years: 32.00    Types: Cigarettes    Quit date: 2012    Years since quittin.5   Smokeless Tobacco Never Used     Current Medications:     Current Outpatient Medications on File Prior to Visit   Medication Sig Dispense Refill    omeprazole (PRILOSEC) 40 MG delayed release capsule TAKE 1 CAPSULE TWICE A  capsule 3    montelukast (SINGULAIR) 10 MG tablet TAKE 1 TABLET DAILY 90 tablet 3    INCRUSE ELLIPTA 62.5 MCG/INH AEPB USE 1 INHALATION DAILY 90 each 3    ipratropium-albuterol (DUONEB) 0.5-2.5 (3) MG/3ML SOLN nebulizer solution Inhale 3 mLs into the lungs every 4 hours DX:COPD J44.9 1080 mL 3    ipratropium-albuterol (DUONEB) 0.5-2.5 (3) MG/3ML SOLN nebulizer solution USE 1 VIAL EVERY 4 TO 6 HOURS AS NEEDED FOR WHEEZING  DX:COPD J44.9 1080 mL 3    albuterol sulfate  (90 Base) MCG/ACT inhaler USE 2 INHALATIONS EVERY 6 HOURS AS NEEDED FOR SHORTNESS OF BREATH 25.5 g 3    pregabalin (LYRICA) 150 MG capsule Take 150 mg by mouth 2 times daily.  albuterol-ipratropium (COMBIVENT RESPIMAT)  MCG/ACT AERS inhaler Inhale 1 puff into the lungs every 6 hours 3 Inhaler 3    aspirin 81 MG tablet Take 81 mg by mouth daily      atorvastatin (LIPITOR) 10 MG tablet Take 10 mg by mouth daily      clotrimazole-betamethasone (LOTRISONE) 1-0.05 % cream Apply 1 % topically 2 times daily      SYNTHROID 100 MCG tablet Take 1 tablet by mouth daily      nystatin (MYCOSTATIN) 809509 UNIT/ML suspension Take 500,000 Units by mouth 4 times daily      cyanocobalamin 1000 MCG/ML injection Inject 1,000 mcg into the muscle every 7 days.  LORazepam (ATIVAN) 0.5 MG tablet Take 0.5 mg by mouth every 6 hours as needed.  empagliflozin (JARDIANCE) 10 MG tablet Take 10 mg by mouth daily      insulin glargine (TOUJEO SOLOSTAR) 300 UNIT/ML injection pen Inject 20 Units into the skin 2 times daily      Dulaglutide (TRULICITY) 8.71 KQ/5.2RW SOPN Inject 0.05 mLs into the skin once a week       No current facility-administered medications on file prior to visit. Review of Systems   Constitutional: Negative for chills and fever. HENT: Negative for congestion and postnasal drip. Respiratory: Positive for cough and shortness of breath. Cardiovascular: Negative for chest pain and leg swelling. Gastrointestinal: Negative for abdominal pain and constipation. Musculoskeletal: Negative for arthralgias and joint swelling. Skin: Negative for color change and pallor. Allergic/Immunologic: Negative for environmental allergies and food allergies. Psychiatric/Behavioral: Negative for agitation and confusion.      Objective:       VITALS:  /78   Pulse 86   Ht 5' 4\" (1.626 m) Wt 214 lb (97.1 kg)   LMP 04/18/2016 Comment: menopausal   SpO2 94%   Breastfeeding No   BMI 36.73 kg/m²  on RA    Physical Exam  Vitals reviewed. Constitutional:       General: She is not in acute distress. Appearance: She is well-developed. HENT:      Head: Normocephalic. Mouth/Throat:      Pharynx: No oropharyngeal exudate. Eyes:      General:         Right eye: No discharge. Left eye: No discharge. Conjunctiva/sclera: Conjunctivae normal.      Pupils: Pupils are equal, round, and reactive to light. Neck:      Trachea: No tracheal deviation. Cardiovascular:      Rate and Rhythm: Normal rate and regular rhythm. Heart sounds: No friction rub. Pulmonary:      Effort: Pulmonary effort is normal. No tachypnea, accessory muscle usage or respiratory distress. Breath sounds: No stridor. No wheezing, rhonchi or rales. Chest:      Chest wall: No tenderness or crepitus. Abdominal:      General: Bowel sounds are normal. There is no distension. Palpations: Abdomen is soft. Tenderness: There is no abdominal tenderness. Musculoskeletal:         General: Normal range of motion. Cervical back: Normal range of motion and neck supple. Lymphadenopathy:      Cervical: No cervical adenopathy. Skin:     General: Skin is warm and dry. Findings: No erythema. Neurological:      Mental Status: She is alert and oriented to person, place, and time. Psychiatric:         Thought Content: Thought content normal.       DATA:      Radiology Review:  Pertinent images / reports were reviewed as a part of this visit. CXR done May 2021:  Heart size and pulmonary vasculature within normal limits. Lungs clear. Costophrenic angles sharp. No pneumothorax     CT Lung Screen done Nov 2020 reveals the following:  Category:  1. Other significant pulmonary abnormalities: Negative. Other significant extrapulmonary abnormalities: None.      Last PFTs 2018:  Spirometry attempts were acceptable and reproducible. FVC  was normal at 3.17 liters, 91% predicted and normal FEV1 2.46 liters, 90%  predicted. FEV1/FVC ratio was normal.  Lung volumes showed normal total  lung capacity of 90% predicted. Diffusion capacity showed decreased DLCO  of 70% predicted. IMPRESSION:  Normal spirometry and lung volumes with mild decrease in  diffusion capacity. In comparison to the test that was done in 10/2015,  FEV1 has improved by 9%. Assessment / Plan:   1. COPD, mild ([address] / chronic cough  - SOB feels worse  - It seems to get better when she is using HHN TID + 1 dose of Combivent  - We discussed using some combo of HHN / Combivent and using Combivent with a spacer  - Continue Incruse  - Repeat Full PFT Study With Bronchodilator    2. GERD without esophagitis  - Wheezing when lying down may be related to GERD  - She is on Prilosec  - She will try avoiding PO 2 hours before rest  - She is using CPAP which also may contribute to airway obstructing when lying flat    3. Shortness of breath  - Multifactorial given obesity, deconditioning, mild COPD  - Recommend increasing activity    Return in about 3 months (around 10/8/2021). RTC sooner if symptoms worsen.     Kris Barnett MSN APRN-ACNP CCRN

## 2021-10-01 ENCOUNTER — VIRTUAL VISIT (OUTPATIENT)
Dept: PULMONOLOGY | Age: 56
End: 2021-10-01
Payer: COMMERCIAL

## 2021-10-01 DIAGNOSIS — J44.9 COPD, MILD (HCC): Primary | ICD-10-CM

## 2021-10-01 DIAGNOSIS — G47.33 OBSTRUCTIVE SLEEP APNEA: Chronic | ICD-10-CM

## 2021-10-01 DIAGNOSIS — R06.02 SOB (SHORTNESS OF BREATH): ICD-10-CM

## 2021-10-01 DIAGNOSIS — K21.00 GASTROESOPHAGEAL REFLUX DISEASE WITH ESOPHAGITIS WITHOUT HEMORRHAGE: Chronic | ICD-10-CM

## 2021-10-01 PROCEDURE — 99214 OFFICE O/P EST MOD 30 MIN: CPT | Performed by: INTERNAL MEDICINE

## 2021-10-01 RX ORDER — OMEPRAZOLE 40 MG/1
40 CAPSULE, DELAYED RELEASE ORAL 2 TIMES DAILY
Qty: 180 CAPSULE | Refills: 3 | Status: SHIPPED | OUTPATIENT
Start: 2021-10-01 | End: 2022-04-05 | Stop reason: SDUPTHER

## 2021-10-01 RX ORDER — UMECLIDINIUM BROMIDE AND VILANTEROL TRIFENATATE 62.5; 25 UG/1; UG/1
1 POWDER RESPIRATORY (INHALATION) DAILY
Qty: 3 EACH | Refills: 3 | Status: SHIPPED | OUTPATIENT
Start: 2021-10-01

## 2021-10-01 RX ORDER — UMECLIDINIUM 62.5 UG/1
1 AEROSOL, POWDER ORAL DAILY
Qty: 3 EACH | Refills: 3 | Status: SHIPPED | OUTPATIENT
Start: 2021-10-01 | End: 2021-10-01

## 2021-10-01 RX ORDER — IPRATROPIUM BROMIDE AND ALBUTEROL SULFATE 2.5; .5 MG/3ML; MG/3ML
1 SOLUTION RESPIRATORY (INHALATION) EVERY 4 HOURS
Qty: 1080 ML | Refills: 3 | Status: SHIPPED | OUTPATIENT
Start: 2021-10-01 | End: 2022-04-05 | Stop reason: SDUPTHER

## 2021-10-01 RX ORDER — MONTELUKAST SODIUM 10 MG/1
10 TABLET ORAL NIGHTLY
Qty: 90 TABLET | Refills: 3 | Status: SHIPPED | OUTPATIENT
Start: 2021-10-01 | End: 2022-04-05 | Stop reason: SDUPTHER

## 2021-10-01 NOTE — PROGRESS NOTES
[facility]  Progress Note  Radha Moscoso MD    Pulmonology Video Visit    Pursuant to the emergency declaration under the [facility] waiver authority and the Coronavirus Preparedness and Response Supplemental Appropriations Act this Video Visit was insisted, with patient's consent, to reduce the patient's risk of exposure to COVID-19 and provide continuity of care for an established patient. The patient was at home, while the provider was at the clinic. Services were provided through a synchronous discussion through a Video Visit to substitute for in-person clinic visit, and coded as such. Zonia Isabel   YOB: 1965    Date of Visit:  10/1/2021    Assessment/Plan:  1. SOB (shortness of breath)  Stable    2. COPD, mild ([address]  PFT 10/15:  Spirometry reveals normal FVC and FEV-1. FEV-1/FVC ratio is reduced. Lung  volumes reveal normal total lung capacity. Vital capacity is increased. Residual volume is decreased. Diffusion capacity is mildly decreased at 74%  of predicted. In comparison to a study from August of 2013, spirometry is  similar.       IMPRESSION: Mild obstructive lung disease. PFT 8/18:  INTERPRETATION:  Spirometry attempts were acceptable and reproducible. FVC  was normal at 3.17 liters, 91% predicted and normal FEV1 2.46 liters, 90%  predicted. FEV1/FVC ratio was normal.  Lung volumes showed normal total  lung capacity of 90% predicted. Diffusion capacity showed decreased DLCO  of 70% predicted.     IMPRESSION:  Normal spirometry and lung volumes with mild decrease in  diffusion capacity. In comparison to the test that was done in 10/2015,  FEV1 has improved by 9%. Incruse   Duoneb/Combivent prn. in advance of activity. She uses a lot of these medications  Recommend Incruse ==> Anoro. LDCT chest 11/20 was ok. Scheduled for repeat but she has not had this done.      3. Obstructive sleep apnea (adult) (pediatric)  CPAP settings: Auto CPAP mode Pmin-6 Pmax-16 Flex-3 Hum-3 Average P- 7.6 Comp 7.5 hrs/night Download AHI - 2.1/hr Mask- Nasal Pillows Machine download/SD card data reviewed and documented. Excellent treatment effect  Follows with Dr Michael Vergara    4. 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 Gastroesophageal reflux disease without esophagitis  Omeprazole 40 mg bid helps     6. Former smoker  Next scan around 11/21    COVID vaccine. UTD      FOLLOW UP: 6 months      Chief Complaint   Patient presents with    Shortness of Breath     never got PFT done because pt cannt go without her medications for the time frame     Wheezing     pt saw Jackson Ruggiero back in July because she could hear herself whistle when she was breathing Still has this problem       HPI  The patient presents with a chief complaint of mild shortness of breath related to mild COPD of many years duration. She has mild associated cough. Exertion is her main modifying factor. No recent flare ups. Has some allergy symptoms. Review of Systems  No Chest pain, Nausea or vomiting reported       Physical Exam:    Video visit    Allergies   Allergen Reactions    Penicillins Anaphylaxis and Rash    Codeine      Prior to Visit Medications    Medication Sig Taking?  Authorizing Provider   Umeclidinium Bromide (INCRUSE ELLIPTA) 62.5 MCG/INH AEPB Inhale 1 puff into the lungs daily Yes Griselda Horta MD   omeprazole (PRILOSEC) 40 MG delayed release capsule Take 1 capsule by mouth 2 times daily Yes Griselda Horta MD   ipratropium-albuterol (DUONEB) 0.5-2.5 (3) MG/3ML SOLN nebulizer solution Inhale 3 mLs into the lungs every 4 hours DX:COPD J44.9 Yes Griselda Horta MD   albuterol-ipratropium (COMBIVENT RESPIMAT)  MCG/ACT AERS inhaler Inhale 1 puff into the lungs every 6 hours Yes Griselda Horta MD   montelukast (SINGULAIR) 10 MG tablet Take 1 tablet by mouth nightly Yes Tomas Cerda MD   albuterol-ipratropium (COMBIVENT RESPIMAT)  MCG/ACT AERS inhaler Inhale 1 puff into the lungs every 6 hours  MELVA Lopez CNP   omeprazole (PRILOSEC) 40 MG delayed release capsule TAKE 1 CAPSULE TWICE A DAY  Tomas Cerda MD   montelukast (SINGULAIR) 10 MG tablet TAKE 1 TABLET DAILY  Tomas Cerda MD   INCRUSE ELLIPTA 62.5 MCG/INH AEPB USE 1 INHALATION DAILY  Tomas Cerda MD   ipratropium-albuterol (DUONEB) 0.5-2.5 (3) MG/3ML SOLN nebulizer solution Inhale 3 mLs into the lungs every 4 hours DX:COPD J44.9  Tomas Cerda MD   ipratropium-albuterol (DUONEB) 0.5-2.5 (3) MG/3ML SOLN nebulizer solution USE 1 VIAL EVERY 4 TO 6 HOURS AS NEEDED FOR WHEEZING  DX:COPD J44.9  Tomas Cerda MD   albuterol sulfate  (90 Base) MCG/ACT inhaler USE 2 INHALATIONS EVERY 6 HOURS AS NEEDED FOR SHORTNESS OF BREATH  Toams Cerda MD   pregabalin (LYRICA) 150 MG capsule Take 150 mg by mouth 2 times daily. Historical Provider, MD   empagliflozin (JARDIANCE) 10 MG tablet Take 10 mg by mouth daily  Historical Provider, MD   insulin glargine (TOUJEO SOLOSTAR) 300 UNIT/ML injection pen Inject 20 Units into the skin 2 times daily  Historical Provider, MD   Dulaglutide (TRULICITY) 8.64 DV/2.3JK SOPN Inject 0.05 mLs into the skin once a week  Historical Provider, MD   aspirin 81 MG tablet Take 81 mg by mouth daily  Historical Provider, MD   atorvastatin (LIPITOR) 10 MG tablet Take 10 mg by mouth daily  Historical Provider, MD   clotrimazole-betamethasone (LOTRISONE) 1-0.05 % cream Apply 1 % topically 2 times daily  Historical Provider, MD   SYNTHROID 100 MCG tablet Take 1 tablet by mouth daily  Historical Provider, MD   nystatin (MYCOSTATIN) 250724 UNIT/ML suspension Take 500,000 Units by mouth 4 times daily  Historical Provider, MD   cyanocobalamin 1000 MCG/ML injection Inject 1,000 mcg into the muscle every 7 days.   Historical Provider, MD   LORazepam (ATIVAN) 0.5 MG tablet Take 0.5 mg by mouth every 6 hours as needed. Historical Provider, MD       There were no vitals filed for this visit. There is no height or weight on file to calculate BMI.      Wt Readings from Last 3 Encounters:   21 214 lb (97.1 kg)   20 208 lb (94.3 kg)   20 209 lb (94.8 kg)     BP Readings from Last 3 Encounters:   21 122/78   20 125/80   20 126/80        Social History     Tobacco Use   Smoking Status Former Smoker    Packs/day: 1.00    Years: 32.00    Pack years: 32.00    Types: Cigarettes    Quit date: 2012    Years since quittin.7   Smokeless Tobacco Never Used

## 2021-10-06 ENCOUNTER — TELEPHONE (OUTPATIENT)
Dept: PULMONOLOGY | Age: 56
End: 2021-10-06

## 2021-10-06 NOTE — TELEPHONE ENCOUNTER
Spoke with patient pertaining to her appt today @ 220. Explained to patient that the appt was based on a new machine, pt stated that she has not gotten her new machine, of which [facility] has put in an order for. Patient wanted [facility] to get her a new machine which I explained is something that we have no control over but once she gets her new machine, call into the office to make an appt between 31-90 days.  Patient was very rude

## 2021-10-06 NOTE — TELEPHONE ENCOUNTER
Patient called because she was scheduled for an appointment for a new machine and she did not get one. She said she just needs a new order sent to her DME due to the fact that when the order was placed in July, her machine was not 11years old yet. After speaking with NP who spoke with 3 DME companies, they said they would not furnish her a new machine unless she brought the machine in and they found it to be non-repairable and it is part of the recall. Called and spoke with patient and told her she would need to take her machine to her DME for them to look at the mold that has built up around the seal of the door. Patient did not complete visit on 10/06/2021.

## 2021-10-11 ENCOUNTER — TELEPHONE (OUTPATIENT)
Dept: PULMONOLOGY | Age: 56
End: 2021-10-11

## 2021-10-11 NOTE — TELEPHONE ENCOUNTER
[facility] called and they need clarification on.    ipratropium-albuterol (DUONEB) 0.5-2.5 (3) MG/3ML SOLN nebulizer solution    Call [phone] use ref # [id number]

## 2022-01-03 RX ORDER — BENZONATATE 200 MG/1
200 CAPSULE ORAL 3 TIMES DAILY PRN
Qty: 30 CAPSULE | Refills: 0 | Status: SHIPPED | OUTPATIENT
Start: 2022-01-03 | End: 2022-02-02

## 2022-02-02 RX ORDER — BENZONATATE 200 MG/1
CAPSULE ORAL
Qty: 30 CAPSULE | Refills: 0 | Status: SHIPPED | OUTPATIENT
Start: 2022-02-02 | End: 2022-07-12

## 2022-03-18 ENCOUNTER — HOSPITAL ENCOUNTER (OUTPATIENT)
Dept: NON INVASIVE DIAGNOSTICS | Age: 57
Discharge: HOME | End: 2022-03-18
Payer: COMMERCIAL

## 2022-03-18 DIAGNOSIS — I10 ESSENTIAL (PRIMARY) HYPERTENSION: ICD-10-CM

## 2022-03-18 LAB
LV EF: 53 %
LVEF MODALITY: NORMAL

## 2022-03-18 PROCEDURE — 93306 TTE W/DOPPLER COMPLETE: CPT

## 2022-03-21 NOTE — PROGRESS NOTES
[address]  4/5/22  Referring: Rajeev Manhattan Dorinda DO    REASON FOR CONSULT/CHIEF COMPLAINT/HPI     Reason for visit/ Chief complaint   New Patient   Swelling and HTN disorder   HPI Stacia Padron is a 61 y. o.female referred by her PCP Dorinda for swelling of bilateral ankles and hypertensive disorder. She had been on dexamethasone per her OB from October until March for right sided hip pain by OB/GYN, which her PCP felt may have contributed to the elevated BP and swelling low lower extremities. She also stated she has been caring for her father and it has caused her a lot of stress. Had COVID at the beginning of January, but she was totally unaware. Today she states the swelling in her legs has gotten better since starting the Lasix and Lopressor that her PCP had prescribed for her. She is complaining about the little red bumps on her legs. Has had chest pain 3 x's before. Patient is adherent with medications and is tolerating them well without side effects     HISTORY/ALLERGIES/ROS     MedHx:  has a past medical history of Anxiety, B12 deficiency anemia, COPD (chronic obstructive pulmonary disease) ([address] Depression, Fatty liver, Fibromyalgia, and Obstructive sleep apnea. SurgHx:  has a past surgical history that includes Tubal ligation; Hemorrhoid surgery; Hemorrhoid surgery; [place] tooth extraction; cyst removal; Colonoscopy (april 13, 2015); and Cholecystectomy (09/13/2016). SocHx:  reports that she quit smoking about 9 years ago. Her smoking use included cigarettes. She has a 32.00 pack-year smoking history. She has never used smokeless tobacco. She reports that she does not drink alcohol and does not use drugs. FamHx: No family history of premature coronary artery disease, sudden death, or aneurysm  Allergies: Penicillins and Codeine     MEDICATIONS      Prior to Admission medications    Medication Sig Start Date End Date Taking?  Authorizing Provider   buPROPion (WELLBUTRIN XL) 150 MG extended release tablet  3/3/22  Yes Historical Provider, MD   furosemide (LASIX) 20 MG tablet  3/8/22  Yes Historical Provider, MD   Cholecalciferol (VITAMIN D3) 50 MCG (2000 UT) CAPS  3/21/22  Yes Historical Provider, MD   insulin glargine (SEMGLEE) 100 UNIT/ML injection vial Inject 60 Units into the skin nightly   Yes Historical Provider, MD   metoprolol tartrate (LOPRESSOR) 50 MG tablet Take 50 mg by mouth 2 times daily   Yes Historical Provider, MD   benzonatate (TESSALON) 200 MG capsule TAKE 1 CAPSULE BY MOUTH THREE TIMES DAILY AS NEEDED FOR COUGH 2/2/22  Yes Trinidad Mata MD   albuterol-ipratropium (COMBIVENT RESPIMAT)  MCG/ACT AERS inhaler Inhale 1 puff into the lungs every 6 hours 10/1/21  Yes Trinidad Mata MD   umeclidinium-vilanterol [facility] ELLIPTA) 62.5-25 MCG/INH AEPB inhaler Inhale 1 puff into the lungs daily 10/1/21  Yes Trinidad Mata MD   albuterol-ipratropium (COMBIVENT RESPIMAT)  MCG/ACT AERS inhaler Inhale 1 puff into the lungs every 6 hours 7/9/21  Yes MELVA Wheatley - CNP   omeprazole (PRILOSEC) 40 MG delayed release capsule TAKE 1 CAPSULE TWICE A DAY 7/6/21  Yes Trinidad Mata MD   montelukast (SINGULAIR) 10 MG tablet TAKE 1 TABLET DAILY 5/25/21  Yes Trinidad Mata MD   ipratropium-albuterol (DUONEB) 0.5-2.5 (3) MG/3ML SOLN nebulizer solution USE 1 VIAL EVERY 4 TO 6 HOURS AS NEEDED FOR WHEEZING  DX:COPD J44.9 7/20/20  Yes Trinidad Mata MD   albuterol sulfate  (90 Base) MCG/ACT inhaler USE 2 INHALATIONS EVERY 6 HOURS AS NEEDED FOR SHORTNESS OF BREATH 7/13/20  Yes Trinidad Mata MD   pregabalin (LYRICA) 150 MG capsule Take 150 mg by mouth 2 times daily.    Yes Historical Provider, MD   empagliflozin (JARDIANCE) 10 MG tablet Take 10 mg by mouth daily   Yes Historical Provider, MD   Dulaglutide (TRULICITY) 6.70 RF/3.9MP SOPN Inject 0.05 mLs into the skin once a week   Yes Historical Provider, MD   aspirin 81 MG tablet Take 81 mg by mouth daily   Yes Historical Provider, MD   atorvastatin (LIPITOR) 10 MG tablet Take 10 mg by mouth daily 11/27/17  Yes Historical Provider, MD   SYNTHROID 100 MCG tablet Take 1 tablet by mouth daily 5/26/17  Yes Historical Provider, MD   nystatin (MYCOSTATIN) 554196 UNIT/ML suspension Take 500,000 Units by mouth 4 times daily   Yes Historical Provider, MD   cyanocobalamin 1000 MCG/ML injection Inject 1,000 mcg into the muscle every 7 days. Yes Historical Provider, MD   LORazepam (ATIVAN) 0.5 MG tablet Take 0.5 mg by mouth every 6 hours as needed. Yes Historical Provider, MD       PHYSICAL EXAM        Vitals:    04/05/22 1517   BP: 126/62   Pulse: 90   SpO2: 95%    Weight: 228 lb 3.2 oz (103.5 kg)     Gen Alert, cooperative, no distress. Cushingoid facies Heart  Regular rate and rhythm, no murmur   Head Normocephalic, atraumatic, no abnormalities Abd  Soft, NT, +BS, no mass, no OM   Eyes PERRLA, conj/corn clear Ext  Ext nl, AT, no C/C, no edema   Nose Nares normal, no drain age, Non-tender Pulse 2+ and symmetric   Throat Lips, mucosa, tongue normal Skin Color/text/turg nl, no rash/lesions   Neck S/S, TM, NT, no bruit Psych Nl mood and affect   Lung CTA-B, unlabored, no DTP     Ch wall NT, no deform       LABS and Imaging     Relevant and available CV data reviewed    EKG personally interpreted: 2/27/2016 SR/SA HR 90     Lipid Panel 12/17/2021 , , HDL 81, LDL 79 Possible Left atrial enlargement    Echo 3/18/2022  Ejection fraction is visually estimated to be 50-55 %. No regional wall motion abnormalities are noted. There is mildly increased left ventricular wall thickness. Normal function of all valves.      Stress:9/13/2013  Normal myocardial perfusion study.    Normal LV function.           Cath: none    Holter:none    Outside/Care everywhere records Reviewed  Labs Reviewed  Prior Imaging, ekg, echo reviewed when available  Medications reviewed  Old Notes reviewed  ASSESSMENT AND PLAN 1.Swelling/HTN Disorder  BP Today 126/62  Lasix 20 mg daily, Lopressor 50 mg daily  I suspect a large part of her swelling is due to chronic steroids, which she is no longer taking. She may have some underlying diastolic dysfunction. Appears euvolemic today. Plan: Continue per PCP    2. SOB/COPD  Duoneb, Combivent Respimat, Anora Ellipta, Albuterol, Singular  Plan: Continue medications per Pulmonology Dr Nereida Lopez     3. DM   A1c 12/17/2021 - 7.8  [place] Trulicity  Plan: Continue medications per Endocrinologist Dr Mitchell Pettit    4. Obesity/DAI - CPAP  BMI 36.73  Plan: Continue to follow up with Sleep Medicine Dr Roberto Lopez, Patient counseled on lifestyle modification, diet, and exercise. Follow Up: As needed    Armando Gramajo MD  Cardiologist Patty Hargrove    Scribe's Attestation: This note was scribed in the presence of Dr. Tiffanie Larson MD by Caitlyn Villanueva RN. 04/05/22     Physician Attestation  The scribe wrote this note in the presence of me Armando Gramajo MD). The scribe may have prepopulated components of this note with my historical  intellectual property under my direct supervision. Any additions to this intellectual property were performed in my presence and at my direction. Furthermore, the content and accuracy of this note have been reviewed by me with edits by me as needed.   Armando Gramajo MD 4/5/2022 4:09 PM

## 2022-04-05 ENCOUNTER — OFFICE VISIT (OUTPATIENT)
Dept: CARDIOLOGY CLINIC | Age: 57
End: 2022-04-05
Payer: COMMERCIAL

## 2022-04-05 VITALS
OXYGEN SATURATION: 95 % | DIASTOLIC BLOOD PRESSURE: 62 MMHG | WEIGHT: 228.2 LBS | HEIGHT: 64 IN | SYSTOLIC BLOOD PRESSURE: 126 MMHG | HEART RATE: 90 BPM | BODY MASS INDEX: 38.96 KG/M2

## 2022-04-05 DIAGNOSIS — R06.02 SOB (SHORTNESS OF BREATH): ICD-10-CM

## 2022-04-05 DIAGNOSIS — M79.89 SWELLING OF BOTH LOWER EXTREMITIES: Primary | ICD-10-CM

## 2022-04-05 DIAGNOSIS — E11.69 TYPE 2 DIABETES MELLITUS WITH OTHER SPECIFIED COMPLICATION, WITH LONG-TERM CURRENT USE OF INSULIN (HCC): ICD-10-CM

## 2022-04-05 DIAGNOSIS — G47.33 OBSTRUCTIVE SLEEP APNEA: Chronic | ICD-10-CM

## 2022-04-05 DIAGNOSIS — Z79.4 TYPE 2 DIABETES MELLITUS WITH OTHER SPECIFIED COMPLICATION, WITH LONG-TERM CURRENT USE OF INSULIN (HCC): ICD-10-CM

## 2022-04-05 DIAGNOSIS — J42 CHRONIC BRONCHITIS, UNSPECIFIED CHRONIC BRONCHITIS TYPE (HCC): Chronic | ICD-10-CM

## 2022-04-05 DIAGNOSIS — I10 HYPERTENSION, UNSPECIFIED TYPE: ICD-10-CM

## 2022-04-05 DIAGNOSIS — Z87.891 FORMER SMOKER: Primary | ICD-10-CM

## 2022-04-05 PROCEDURE — 93000 ELECTROCARDIOGRAM COMPLETE: CPT | Performed by: INTERNAL MEDICINE

## 2022-04-05 PROCEDURE — 99213 OFFICE O/P EST LOW 20 MIN: CPT | Performed by: INTERNAL MEDICINE

## 2022-04-05 RX ORDER — ACETAMINOPHEN 160 MG
TABLET,DISINTEGRATING ORAL
COMMUNITY
Start: 2022-03-21

## 2022-04-05 RX ORDER — INSULIN GLARGINE 100 [IU]/ML
60 INJECTION, SOLUTION SUBCUTANEOUS NIGHTLY
COMMUNITY

## 2022-04-05 RX ORDER — BUPROPION HYDROCHLORIDE 150 MG/1
TABLET ORAL
COMMUNITY
Start: 2022-03-03

## 2022-04-05 RX ORDER — METOPROLOL TARTRATE 50 MG/1
50 TABLET, FILM COATED ORAL 2 TIMES DAILY
COMMUNITY

## 2022-04-05 RX ORDER — FUROSEMIDE 20 MG/1
TABLET ORAL
COMMUNITY
Start: 2022-03-08

## 2022-04-07 ENCOUNTER — TELEPHONE (OUTPATIENT)
Dept: CARDIOLOGY CLINIC | Age: 57
End: 2022-04-07

## 2022-04-07 NOTE — TELEPHONE ENCOUNTER
Ron from the patient PCP office is calling asking for the last office note. It was faxed to [phone] Conformation was successful.

## 2022-04-18 ENCOUNTER — TELEPHONE (OUTPATIENT)
Dept: PULMONOLOGY | Age: 57
End: 2022-04-18

## 2022-04-18 NOTE — TELEPHONE ENCOUNTER
----- Message from Cara Valadez sent at 4/18/2022  8:58 AM EDT -----  Regarding: Needs Appointment  Pt left a vm on my line about needing an appt. She also mentioned [employer] and supplies. Might need a phone call back soon: [phone]    Thanks!   Katelynn Noel

## 2022-04-26 ENCOUNTER — OFFICE VISIT (OUTPATIENT)
Dept: PULMONOLOGY | Age: 57
End: 2022-04-26
Payer: COMMERCIAL

## 2022-04-26 ENCOUNTER — HOSPITAL ENCOUNTER (OUTPATIENT)
Dept: CT IMAGING | Age: 57
Discharge: HOME | End: 2022-04-26
Payer: COMMERCIAL

## 2022-04-26 VITALS — OXYGEN SATURATION: 100 % | HEART RATE: 84 BPM

## 2022-04-26 DIAGNOSIS — K21.00 GASTROESOPHAGEAL REFLUX DISEASE WITH ESOPHAGITIS WITHOUT HEMORRHAGE: Chronic | ICD-10-CM

## 2022-04-26 DIAGNOSIS — G47.33 OBSTRUCTIVE SLEEP APNEA: Chronic | ICD-10-CM

## 2022-04-26 DIAGNOSIS — R06.02 SOB (SHORTNESS OF BREATH): Primary | ICD-10-CM

## 2022-04-26 DIAGNOSIS — Z87.891 FORMER SMOKER: ICD-10-CM

## 2022-04-26 DIAGNOSIS — J44.9 COPD, MILD (HCC): Chronic | ICD-10-CM

## 2022-04-26 PROCEDURE — 71271 CT THORAX LUNG CANCER SCR C-: CPT

## 2022-04-26 PROCEDURE — 99214 OFFICE O/P EST MOD 30 MIN: CPT | Performed by: INTERNAL MEDICINE

## 2022-04-26 NOTE — PROGRESS NOTES
[facility]  Progress Note  Siddharth Abel MD       Ginette Shresthaa   YOB: 1965    Date of Visit:  4/26/2022    Assessment/Plan:  1. SOB (shortness of breath)  Stable    2. COPD, mild ([address]  PFT 10/15:  Spirometry reveals normal FVC and FEV-1. FEV-1/FVC ratio is reduced. Lung  volumes reveal normal total lung capacity. Vital capacity is increased. Residual volume is decreased. Diffusion capacity is mildly decreased at 74%  of predicted. In comparison to a study from August of 2013, spirometry is  similar.       IMPRESSION: Mild obstructive lung disease. PFT 8/18:  INTERPRETATION:  Spirometry attempts were acceptable and reproducible. FVC  was normal at 3.17 liters, 91% predicted and normal FEV1 2.46 liters, 90%  predicted. FEV1/FVC ratio was normal.  Lung volumes showed normal total  lung capacity of 90% predicted. Diffusion capacity showed decreased DLCO  of 70% predicted.     IMPRESSION:  Normal spirometry and lung volumes with mild decrease in  diffusion capacity. In comparison to the test that was done in 10/2015,  FEV1 has improved by 9%. Advair==> gave her thrush  Anoro  Duoneb (she uses before Anoro and that really helps)      3. Obstructive sleep apnea (adult) (pediatric)  CPAP settings: Auto CPAP mode Pmin-6 Pmax-16 Flex-3 Hum-3 Average P- 7.6 Comp 7.5 hrs/night Download AHI - 2.1/hr Mask- Nasal Pillows Machine download/SD card data reviewed and documented. Excellent treatment effect  Follows with Dr Luiz Blanco    4. 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 Gastroesophageal reflux disease without esophagitis  Omeprazole 40 mg bid helps     6.  Former smoker  LDCT chest 11/19 is clear  Next scan around 11/20      FOLLOW UP: 6 months      Chief Complaint   Patient presents with    Shortness of Breath     had COVID after Christmas 2021 but only mild symptoms    Discuss Medications     was on Alisonfransisco from Oct 2021 theu March 2022 for OBGYN reasons After stopping this got really sick Will get swelling in Right leg/groin area if walking a distance seen Cardiologis and told she is fine        HPI  The patient presents with a chief complaint of mild shortness of breath related to mild COPD of many years duration. She has mild associated cough. Exertion is her main modifying factor. No recent flare ups. No Chest pain, Nausea or vomiting reported. She idd havce COVID and recovered from that. Review of Systems  No Chest pain, Nausea or vomiting reported      Physical Exam:  Well developed, well nourished  Alert and oriented  Sclera is clear  No cervical adenopathy  No JVD. Chest examination is clear. Cardiac examination reveals regular rate and rhythm without murmur, gallop or rub. The abdomen is soft, nontender and nondistended. There is no clubbing, cyanosis or edema of the extremities. There is no obvious skin rash. No focal neuro deficicts  Normal mood and affect    Allergies   Allergen Reactions    Penicillins Anaphylaxis and Rash    Codeine      Prior to Visit Medications    Medication Sig Taking?  Authorizing Provider   buPROPion (WELLBUTRIN XL) 150 MG extended release tablet   Historical Provider, MD   furosemide (LASIX) 20 MG tablet   Historical Provider, MD   Cholecalciferol (VITAMIN D3) 50 MCG (2000 UT) CAPS   Historical Provider, MD   insulin glargine (SEMGLEE) 100 UNIT/ML injection vial Inject 60 Units into the skin nightly  Historical Provider, MD   metoprolol tartrate (LOPRESSOR) 50 MG tablet Take 50 mg by mouth 2 times daily  Historical Provider, MD   benzonatate (TESSALON) 200 MG capsule TAKE 1 CAPSULE BY MOUTH THREE TIMES DAILY AS NEEDED FOR COUGH  Nan Caldera MD   albuterol-ipratropium (COMBIVENT RESPIMAT)  MCG/ACT AERS inhaler Inhale 1 puff into the lungs every 6 hours  Nan Caldera MD   umeclidinium-vilanterol (ANORO ELLIPTA) 62.5-25 MCG/INH AEPB inhaler History     Tobacco Use   Smoking Status Former Smoker    Packs/day: 1.00    Years: 32.00    Pack years: 32.00    Types: Cigarettes    Quit date: 2012    Years since quittin.3   Smokeless Tobacco Never Used

## 2022-05-02 ENCOUNTER — TELEPHONE (OUTPATIENT)
Dept: CASE MANAGEMENT | Age: 57
End: 2022-05-02

## 2022-07-12 ENCOUNTER — TELEMEDICINE (OUTPATIENT)
Dept: PULMONOLOGY | Age: 57
End: 2022-07-12
Payer: COMMERCIAL

## 2022-07-12 DIAGNOSIS — E03.9 ACQUIRED HYPOTHYROIDISM: Chronic | ICD-10-CM

## 2022-07-12 DIAGNOSIS — E11.9 TYPE 2 DIABETES MELLITUS WITHOUT COMPLICATION, WITH LONG-TERM CURRENT USE OF INSULIN (HCC): ICD-10-CM

## 2022-07-12 DIAGNOSIS — E66.2 CLASS 1 OBESITY WITH ALVEOLAR HYPOVENTILATION WITHOUT SERIOUS COMORBIDITY WITH BODY MASS INDEX (BMI) OF 34.0 TO 34.9 IN ADULT (HCC): ICD-10-CM

## 2022-07-12 DIAGNOSIS — Z79.4 TYPE 2 DIABETES MELLITUS WITHOUT COMPLICATION, WITH LONG-TERM CURRENT USE OF INSULIN (HCC): ICD-10-CM

## 2022-07-12 DIAGNOSIS — G47.33 OBSTRUCTIVE SLEEP APNEA: Primary | Chronic | ICD-10-CM

## 2022-07-12 DIAGNOSIS — K21.00 GASTROESOPHAGEAL REFLUX DISEASE WITH ESOPHAGITIS WITHOUT HEMORRHAGE: Chronic | ICD-10-CM

## 2022-07-12 DIAGNOSIS — J42 CHRONIC BRONCHITIS, UNSPECIFIED CHRONIC BRONCHITIS TYPE (HCC): Chronic | ICD-10-CM

## 2022-07-12 PROCEDURE — 99214 OFFICE O/P EST MOD 30 MIN: CPT | Performed by: NURSE PRACTITIONER

## 2022-07-12 ASSESSMENT — SLEEP AND FATIGUE QUESTIONNAIRES
HOW LIKELY ARE YOU TO NOD OFF OR FALL ASLEEP WHILE LYING DOWN TO REST IN THE AFTERNOON WHEN CIRCUMSTANCES PERMIT: 2
HOW LIKELY ARE YOU TO NOD OFF OR FALL ASLEEP WHEN YOU ARE A PASSENGER IN A CAR FOR AN HOUR WITHOUT A BREAK: 2
HOW LIKELY ARE YOU TO NOD OFF OR FALL ASLEEP WHILE SITTING INACTIVE IN A PUBLIC PLACE: 0
HOW LIKELY ARE YOU TO NOD OFF OR FALL ASLEEP WHILE SITTING QUIETLY AFTER LUNCH WITHOUT ALCOHOL: 1
ESS TOTAL SCORE: 8
HOW LIKELY ARE YOU TO NOD OFF OR FALL ASLEEP WHILE WATCHING TV: 1
HOW LIKELY ARE YOU TO NOD OFF OR FALL ASLEEP WHILE SITTING AND READING: 2
HOW LIKELY ARE YOU TO NOD OFF OR FALL ASLEEP WHILE SITTING AND TALKING TO SOMEONE: 0
HOW LIKELY ARE YOU TO NOD OFF OR FALL ASLEEP IN A CAR, WHILE STOPPED FOR A FEW MINUTES IN TRAFFIC: 0

## 2022-07-12 NOTE — PROGRESS NOTES
Diagnosis: [x] DAI (G47.33) [] CSA (G47.31) [] Apnea (G47.30)   Length of Need: [x] 15 Months [] 99 Months [] Other:   Machine (LIZY!): [] Respironics Dream Station      Auto [] ResMed AirSense     Auto [] Other:     []  CPAP () [] Bilevel ()   Mode: [] Auto [] Spontaneous    Mode: [] Auto [] Spontaneous             Comfort Settings:      Humidifier: [] Heated ()        [x] Water chamber replacement ()/ 1 per 6 months        Mask:   [] Nasal () /1 per 3 months [x] Full Face () /1 per 3 months   [] Patient choice -Size and fit mask [x] Patient Choice - Size and fit mask   [] Dispense: [] Dispense:   [] Headgear () / 1 per 3 months [x] Headgear () / 1 per 3 months   [] Replacement Nasal Cushion ()/2 per month [x] Interface Replacement ()/1 per month   [] Replacement Nasal Pillows ()/2 per month         Tubing: [x] Heated ()/1 per 3 months    [] Standard ()/1 per 3 months [] Other:           Filters: [x] Non-disposable ()/1 per 6 months     [x] Ultra-Fine, Disposable ()/2 per month        Miscellaneous: [] Chin Strap ()/ 1 per 6 months [] O2 bleed-in:        LPM   [] Oxymetry on CPAP/Bilevel []  Other:         Start Order Date: 07/12/22    MEDICAL JUSTIFICATION:  I, the undersigned, certify that the above prescribed supplies are medically necessary for this patients wellbeing. In my opinion, the supplies are both reasonable and necessary in reference to accepted standards of medicalpractice in treatment of this patients condition. Mindi Morales NP    NPI: [id number]       Order Signed Date: 07/12/22  [facility]  Pulmonary, Sleep, and Critical Care    Pulmonary, Sleep, and Critical Care  [address] [place] [address].  [address] [facility] [[email or web address]], [address] [facility] , [address] [facility]                                    Gui Good  Phone: [phone]    Fax: [address] [place]  1965  [facility] (home)   [phone] (mobile)      Insurance Info (confirm with patient if correct):  Payor/Plan Subscr  Sex Relation Sub.  Ins. ID Effective Group Num

## 2022-07-12 NOTE — ASSESSMENT & PLAN NOTE
Chronic-Stable: Reviewed and analyzed results of physiologic download from patient's machine and reviewed with patient. Supplies and parts as needed for her machine. These are medically necessary. Limit caffeine use after 3pm. Based on the analyzed data will change following settings: P min increased to 7. Pressure changes sent via machine modem. Will trial pressure increase to see if this will help with daytime symptoms. Encouraged her to discuss increased sleepiness after Wellbutrin restarted with her PCP. No driving when sleepy. Will send order for chinstrap with her nasal mask to help control oral leak and dryness. If this does not resolve would recommend she try a full facemask. Discussed adjusting the moisture settings on her machine. Would like to see her back sooner to monitor symptoms and pressure adjustments but patient declines at this time and reports she will call the office with no improvement. Will see her back in 1 year. Encouraged her to call the office with any questions or concerns.

## 2022-07-12 NOTE — LETTER
[facility] Sleep Medicine  [id number] [facility]  [address] [id number]  Phone: [phone]  Fax: [phone]    July 12, 2022       Patient: Levon Redman   MR Number: [id number]   YOB: 1965   Date of Visit: 7/12/2022       James Terry was seen for a follow up visit today. Here is my assessment and plan as well as an attached copy of her visit today:    COPD (chronic obstructive pulmonary disease) ([address]   Chronic- Stable. Discussed the importance of treating obstructive sleep apnea as part of the management of this disorder. Cont any meds per PCP and other physicians. Acquired hypothyroidism   Chronic- Stable. Discussed the importance of treating obstructive sleep apnea as part of the management of this disorder. Cont any meds per PCP and other physicians. GERD (gastroesophageal reflux disease)  Chronic- Stable. Discussed the importance of treating obstructive sleep apnea as part of the management of this disorder. Cont any meds per PCP and other physicians. Class 1 obesity with alveolar hypoventilation without serious comorbidity with body mass index (BMI) of 34.0 to 34.9 in adult [facility])   Chronic-not stable:  Discussed importance of treating obstructive sleep apnea and getting sufficient sleep to assist with weight control. Encouraged her to work on weight loss through diet and exercise. Recommended DASH or Mediterranean diets. Type 2 diabetes mellitus, with long-term current use of insulin (HCC)   Chronic- Stable. Discussed the importance of treating obstructive sleep apnea as part of the management of this disorder. Cont any meds per PCP and other physicians. Obstructive sleep apnea   Chronic-Stable: Reviewed and analyzed results of physiologic download from patient's machine and reviewed with patient. Supplies and parts as needed for her machine. These are medically necessary.   Limit caffeine use after 3pm. Based on the analyzed data will change following settings: P min increased to 7. Pressure changes sent via machine modem. Will trial pressure increase to see if this will help with daytime symptoms. Encouraged her to discuss increased sleepiness after Wellbutrin restarted with her PCP. No driving when sleepy. Will send order for chinstrap with her nasal mask to help control oral leak and dryness. If this does not resolve would recommend she try a full facemask. Discussed adjusting the moisture settings on her machine. Would like to see her back sooner to monitor symptoms and pressure adjustments but patient declines at this time and reports she will call the office with no improvement. Will see her back in 1 year. Encouraged her to call the office with any questions or concerns. If you have questions or concerns, please do not hesitate to call me. I look forward to following Charlie More along with you.     Sincerely,    MELVA Palacio    CC providers:  Jasmyn Seth,   [id number] [facility] [address]

## 2022-07-12 NOTE — PROGRESS NOTES
her to call the office with any questions or concerns. 2. Chronic bronchitis, unspecified chronic bronchitis type ([address]  Assessment & Plan:   Chronic- Stable. Discussed the importance of treating obstructive sleep apnea as part of the management of this disorder. Cont any meds per PCP and other physicians. 3. Type 2 diabetes mellitus without complication, with long-term current use of insulin (HCC)  Assessment & Plan:   Chronic- Stable. Discussed the importance of treating obstructive sleep apnea as part of the management of this disorder. Cont any meds per PCP and other physicians. 4. Acquired hypothyroidism  Assessment & Plan:   Chronic- Stable. Discussed the importance of treating obstructive sleep apnea as part of the management of this disorder. Cont any meds per PCP and other physicians. 5. Gastroesophageal reflux disease with esophagitis without hemorrhage  Assessment & Plan:  Chronic- Stable. Discussed the importance of treating obstructive sleep apnea as part of the management of this disorder. Cont any meds per PCP and other physicians. 6. Class 1 obesity with alveolar hypoventilation without serious comorbidity with body mass index (BMI) of 34.0 to 34.9 in adult [facility])  Assessment & Plan:   Chronic-not stable:  Discussed importance of treating obstructive sleep apnea and getting sufficient sleep to assist with weight control. Encouraged her to work on weight loss through diet and exercise. Recommended DASH or Mediterranean diets. Reviewed, analyzed, and documented physiologic data from patient's PAP machine. This information was analyzed to assess complexity and medical decision making in regards to further testing and management. The primary encounter diagnosis was Obstructive sleep apnea.  Diagnoses of Chronic bronchitis, unspecified chronic bronchitis type ([address] Type 2 diabetes mellitus without complication, with long-term current use of insulin ([address] Acquired hypothyroidism, Gastroesophageal reflux disease with esophagitis without hemorrhage, and Class 1 obesity with alveolar hypoventilation without serious comorbidity with body mass index (BMI) of 34.0 to 34.9 in [facility]) were also pertinent to this visit. The chronic medical conditions listed are directly related to the primary diagnosis listed above. The management of the primary diagnosis affects the secondary diagnosis and vice versa. Subjective:     Patient ID: Summer Cordero is a 61 y.o. female. Chief Complaint   Patient presents with    Sleep Apnea     Subjective   HPI:    Machine Modem/Download Info:  Compliance (hours/night): 7.11 hrs/night  % of nights >= 4 hrs: 91.1 %  Download AHI (/hour): 2 /HR  Average CPAP Pressure : 7.5 cmH2O      APAP - Settings  Pressure Min: 6 cmH2O  Pressure Max: 16 cmH2O                 Comfort Settings  Humidity Level (0-8): 3  Flex/EPR (0-3): 3 PAP Mask  Mask Type: Nasal mask     Summer Cordero reports she is doing well with her machine. She has received her replacement machine for the  recall. The pressure on her machine is comfortable and she is waking rested for the most part. She reports over the last month she has had some more sleepiness during the day. She went through a period of time where she thought she had COVID and was spending a lot of time sleeping on her couch. She reports her primary care felt it was depression and started her on Wellbutrin. She thinks this may have caused her more of the sleepiness and does not necessarily think it was depression. She has not discussed this with her primary care provider. She reports some some oral leak and dryness with her nasal mask. Otherwise her mask is comfortable and is fitting well.  she denies headaches, congestion, nosebleeds, aerophagia, or drowsiness while driving.       [address]    [place] - Total score: 8    Current Outpatient Medications   Medication Instructions    albuterol sulfate  (90 Base) MCG/ACT inhaler USE 2 INHALATIONS EVERY 6 HOURS AS NEEDED FOR SHORTNESS OF BREATH    albuterol-ipratropium (COMBIVENT RESPIMAT)  MCG/ACT AERS inhaler 1 puff, Inhalation, EVERY 6 HOURS    albuterol-ipratropium (COMBIVENT RESPIMAT)  MCG/ACT AERS inhaler 1 puff, Inhalation, EVERY 6 HOURS    aspirin 81 mg, Oral, DAILY    atorvastatin (LIPITOR) 10 mg, Oral, DAILY    benzonatate (TESSALON) 200 MG capsule TAKE 1 CAPSULE BY MOUTH THREE TIMES DAILY AS NEEDED FOR COUGH    buPROPion (WELLBUTRIN XL) 150 MG extended release tablet No dose, route, or frequency recorded.  Cholecalciferol (VITAMIN D3) 50 MCG (2000 UT) CAPS No dose, route, or frequency recorded.  cyanocobalamin 1,000 mcg, EVERY 7 DAYS    Dulaglutide (TRULICITY) 6.67 OS/0.1VB SOPN 0.05 mLs, SubCUTAneous, WEEKLY    empagliflozin (JARDIANCE) 10 mg, Oral, DAILY    furosemide (LASIX) 20 MG tablet No dose, route, or frequency recorded.     insulin glargine (SEMGLEE) 60 Units, SubCUTAneous, NIGHTLY    ipratropium-albuterol (DUONEB) 0.5-2.5 (3) MG/3ML SOLN nebulizer solution USE 1 VIAL EVERY 4 TO 6 HOURS AS NEEDED FOR WHEEZINGDX:COPD J44.9    LORazepam (ATIVAN) 0.5 mg, EVERY 6 HOURS PRN    metoprolol tartrate (LOPRESSOR) 50 mg, Oral, 2 TIMES DAILY    montelukast (SINGULAIR) 10 MG tablet TAKE 1 TABLET DAILY    nystatin (MYCOSTATIN) 500,000 Units, Oral, 4 TIMES DAILY    omeprazole (PRILOSEC) 40 MG delayed release capsule TAKE 1 CAPSULE TWICE A DAY    pregabalin (LYRICA) 150 mg, Oral, 2 TIMES DAILY    SYNTHROID 100 MCG tablet 1 tablet, Oral, DAILY    umeclidinium-vilanterol (ANORO ELLIPTA) 62.5-25 MCG/INH AEPB inhaler 1 puff, Inhalation, DAILY        Electronically signed by Luan Brittle, APRN on 7/12/2022 at 10:03 AM

## 2022-07-12 NOTE — PROGRESS NOTES
[address] [facility]  1965  [place] (home)   [phone] (mobile)      Insurance Info (confirm with patient if correct):  Payor/Plan Subscr  Sex Relation Sub.  Ins. ID Effective Group Num

## 2022-10-04 RX ORDER — IPRATROPIUM BROMIDE AND ALBUTEROL SULFATE 2.5; .5 MG/3ML; MG/3ML
SOLUTION RESPIRATORY (INHALATION)
Qty: 1080 ML | Refills: 3 | Status: SHIPPED | OUTPATIENT
Start: 2022-10-04

## 2022-11-04 ENCOUNTER — TELEPHONE (OUTPATIENT)
Dept: PULMONOLOGY | Age: 57
End: 2022-11-04

## 2022-11-04 NOTE — TELEPHONE ENCOUNTER
Pt called back and said that this paperwork if for her appointment that is scheduled for 11/14/22, just wanted us to have it for the appointment.

## 2022-11-04 NOTE — TELEPHONE ENCOUNTER
Received [employer] papers today and not sure what this is for.  Pt last seen 4-2022 Called and left message for pt to call me back to discuss

## 2022-11-28 RX ORDER — IPRATROPIUM BROMIDE AND ALBUTEROL SULFATE 2.5; .5 MG/3ML; MG/3ML
SOLUTION RESPIRATORY (INHALATION)
Qty: 810 ML | Refills: 3 | Status: SHIPPED | OUTPATIENT
Start: 2022-11-28

## 2022-12-02 ENCOUNTER — OFFICE VISIT (OUTPATIENT)
Dept: PULMONOLOGY | Age: 57
End: 2022-12-02
Payer: COMMERCIAL

## 2022-12-02 ENCOUNTER — PATIENT MESSAGE (OUTPATIENT)
Dept: PULMONOLOGY | Age: 57
End: 2022-12-02

## 2022-12-02 DIAGNOSIS — R91.1 PULMONARY NODULE: ICD-10-CM

## 2022-12-02 DIAGNOSIS — J44.9 COPD, MILD (HCC): Primary | Chronic | ICD-10-CM

## 2022-12-02 DIAGNOSIS — Z87.891 PERSONAL HISTORY OF TOBACCO USE: ICD-10-CM

## 2022-12-02 DIAGNOSIS — K21.00 GASTROESOPHAGEAL REFLUX DISEASE WITH ESOPHAGITIS WITHOUT HEMORRHAGE: Chronic | ICD-10-CM

## 2022-12-02 DIAGNOSIS — G47.33 OBSTRUCTIVE SLEEP APNEA: Chronic | ICD-10-CM

## 2022-12-02 PROCEDURE — G0296 VISIT TO DETERM LDCT ELIG: HCPCS | Performed by: INTERNAL MEDICINE

## 2022-12-02 PROCEDURE — 99214 OFFICE O/P EST MOD 30 MIN: CPT | Performed by: INTERNAL MEDICINE

## 2022-12-02 RX ORDER — ALBUTEROL SULFATE 90 UG/1
2 AEROSOL, METERED RESPIRATORY (INHALATION) EVERY 6 HOURS PRN
Qty: 54 G | Refills: 3 | Status: SHIPPED | OUTPATIENT
Start: 2022-12-02 | End: 2023-12-02

## 2022-12-02 RX ORDER — UMECLIDINIUM BROMIDE AND VILANTEROL TRIFENATATE 62.5; 25 UG/1; UG/1
1 POWDER RESPIRATORY (INHALATION) DAILY
Qty: 3 EACH | Refills: 3 | Status: SHIPPED | OUTPATIENT
Start: 2022-12-02

## 2022-12-02 RX ORDER — IPRATROPIUM BROMIDE AND ALBUTEROL SULFATE 2.5; .5 MG/3ML; MG/3ML
1 SOLUTION RESPIRATORY (INHALATION) EVERY 4 HOURS
Qty: 1080 ML | Refills: 3 | Status: SHIPPED | OUTPATIENT
Start: 2022-12-02

## 2022-12-02 RX ORDER — OMEPRAZOLE 40 MG/1
40 CAPSULE, DELAYED RELEASE ORAL 2 TIMES DAILY
Qty: 180 CAPSULE | Refills: 3 | Status: SHIPPED | OUTPATIENT
Start: 2022-12-02

## 2022-12-02 NOTE — PATIENT INSTRUCTIONS

## 2022-12-02 NOTE — TELEPHONE ENCOUNTER
From: Sharon Geller  To: Gracie Crabtree  Sent: 12/2/2022 12:11 PM EST  Subject: Anota inhaler    Hello. I have a 1:15pm appointment today and need an inhaler of Anora or same type medicine, if possible. I re ordered too late and I am out of it. I am sick so I am afraid to go without it. Thanks!

## 2022-12-02 NOTE — PROGRESS NOTES
[facility]  Progress Note  Anisa Roach MD       Camilo Naidus   YOB: 1965    Date of Visit:  12/2/2022    Assessment/Plan:  1. SOB (shortness of breath)  Stable    2. COPD, mild ([address]  PFT 10/15:  Spirometry reveals normal FVC and FEV-1. FEV-1/FVC ratio is reduced. Lung  volumes reveal normal total lung capacity. Vital capacity is increased. Residual volume is decreased. Diffusion capacity is mildly decreased at 74%  of predicted. In comparison to a study from August of 2013, spirometry is  similar. IMPRESSION: Mild obstructive lung disease. PFT 8/18:  INTERPRETATION:  Spirometry attempts were acceptable and reproducible. FVC  was normal at 3.17 liters, 91% predicted and normal FEV1 2.46 liters, 90%  predicted. FEV1/FVC ratio was normal.  Lung volumes showed normal total  lung capacity of 90% predicted. Diffusion capacity showed decreased DLCO  of 70% predicted. IMPRESSION:  Normal spirometry and lung volumes with mild decrease in  diffusion capacity. In comparison to the test that was done in 10/2015,  FEV1 has improved by 9%. Advair==> gave her thrush  Anoro  Duoneb (she uses before Anoro and that really helps)      3. Obstructive sleep apnea (adult) (pediatric)  CPAP settings: Auto CPAP mode Pmin-6 Pmax-16 Flex-3 Hum-3 Average P- 7.6 Comp 7.5 hrs/night Download AHI - 2.1/hr Mask- Nasal Pillows Machine download/SD card data reviewed and documented. Excellent treatment effect  Follows with Dr Sarah Reeder    4. 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 Gastroesophageal reflux disease without esophagitis  Omeprazole 40 mg bid helps     6. Former smoker/PN  CT Chest 4/22:    FINDINGS:   Mediastinum: The unenhanced heart is unremarkable. There are no enlarged   thoracic lymph nodes. Lungs/pleura: The tracheobronchial tree is patent. There is no pneumothorax   or pleural effusion.   Mild emphysema involves the bilateral upper lungs. There is a new 3 mm solid right upper lobe pulmonary nodule. Upper Abdomen: Status post cholecystectomy. Soft Tissues/Bones: Degenerative changes involve the thoracic spine. Impression   1. New 3 mm solid right upper lobe pulmonary nodule. Repeat CT in April '23 which could be LDCT screening exam    FOLLOW UP: 6 months      Chief Complaint   Patient presents with    Shortness of Breath     6 month Since being ill back in March pt states she just doesn't do much        HPI  The patient presents with a chief complaint of mild shortness of breath related to mild COPD of many years duration. She has mild associated cough. Exertion is her main modifying factor. No recent flare ups. No Chest pain, Nausea or vomiting reported. She did have COVID 3/22 and recovered from that. Review of Systems  No Chest pain, Nausea or vomiting reported      Physical Exam:  Well developed, well nourished  Alert and oriented  Sclera is clear  No cervical adenopathy  No JVD. Chest examination is clear. Cardiac examination reveals regular rate and rhythm without murmur, gallop or rub. The abdomen is soft, nontender and nondistended. There is no clubbing, cyanosis or edema of the extremities. There is no obvious skin rash. No focal neuro deficicts  Normal mood and affect    Allergies   Allergen Reactions    Penicillins Anaphylaxis and Rash    Codeine      Prior to Visit Medications    Medication Sig Taking?  Authorizing Provider   ANORO ELLIPTA 62.5-25 MCG/INH AEPB inhaler USE 1 INHALATION DAILY  Maci White MD   ipratropium-albuterol (DUONEB) 0.5-2.5 (3) MG/3ML SOLN nebulizer solution INHALE CONTENTS OF 1 VIAL (3 ML) EVERY 4 HOURS AS NEEDED  Maci White MD   buPROPion (WELLBUTRIN XL) 150 MG extended release tablet   Historical Provider, MD   furosemide (LASIX) 20 MG tablet   Historical Provider, MD   Cholecalciferol (VITAMIN D3) 50 MCG (2000 UT) CAPS   Historical Provider, MD   insulin glargine (SEMGLEE) 100 UNIT/ML injection vial Inject 60 Units into the skin nightly  Historical Provider, MD   metoprolol tartrate (LOPRESSOR) 50 MG tablet Take 50 mg by mouth 2 times daily  Historical Provider, MD   albuterol-ipratropium (COMBIVENT RESPIMAT)  MCG/ACT AERS inhaler Inhale 1 puff into the lungs every 6 hours  Eliezer Powell MD   albuterol-ipratropium (COMBIVENT RESPIMAT)  MCG/ACT AERS inhaler Inhale 1 puff into the lungs every 6 hours  MELVA Ann - CNP   omeprazole (PRILOSEC) 40 MG delayed release capsule TAKE 1 CAPSULE TWICE A DAY  Eliezer Powell MD   montelukast (SINGULAIR) 10 MG tablet TAKE 1 TABLET DAILY  Eliezer Powell MD   albuterol sulfate  (90 Base) MCG/ACT inhaler USE 2 INHALATIONS EVERY 6 HOURS AS NEEDED FOR SHORTNESS OF BREATH  Eliezer Powell MD   pregabalin (LYRICA) 150 MG capsule Take 150 mg by mouth 2 times daily. Historical Provider, MD   Dulaglutide (TRULICITY) 5.21 ZT/9.3VX SOPN Inject 0.05 mLs into the skin once a week  Historical Provider, MD   aspirin 81 MG tablet Take 81 mg by mouth daily  Historical Provider, MD   atorvastatin (LIPITOR) 10 MG tablet Take 10 mg by mouth daily  Historical Provider, MD   SYNTHROID 100 MCG tablet Take 1 tablet by mouth daily  Historical Provider, MD   nystatin (MYCOSTATIN) 652147 UNIT/ML suspension Take 500,000 Units by mouth 4 times daily  Historical Provider, MD   cyanocobalamin 1000 MCG/ML injection Inject 1,000 mcg into the muscle every 7 days. Historical Provider, MD   LORazepam (ATIVAN) 0.5 MG tablet Take 0.5 mg by mouth every 6 hours as needed. Historical Provider, MD       There were no vitals filed for this visit. There is no height or weight on file to calculate BMI.      Wt Readings from Last 3 Encounters:   04/05/22 228 lb 3.2 oz (103.5 kg)   07/08/21 214 lb (97.1 kg)   01/06/20 208 lb (94.3 kg)     BP Readings from Last 3 Encounters: 22 126/62   21 122/78   20 125/80        Social History     Tobacco Use   Smoking Status Former    Packs/day: 1.00    Years: 32.00    Pack years: 32.00    Types: Cigarettes    Quit date: 2012    Years since quittin.9   Smokeless Tobacco Never                        Discussed with the patient the current USPSTF guidelines released 2021 for screening for lung cancer. For adults aged 48 to [de-identified] years who have a 20 pack-year smoking history and currently smoke or have quit within the past 15 years the grade B recommendation is to:  Screen for lung cancer with low-dose computed tomography (LDCT) every year. Stop screening once a person has not smoked for 15 years or has a health problem that limits life expectancy or the ability to have lung surgery. The patient  reports that she quit smoking about 9 years ago. Her smoking use included cigarettes. She has a 32.00 pack-year smoking history. She has never used smokeless tobacco.. Discussed with patient the risks and benefits of screening, including over-diagnosis, false positive rate, and total radiation exposure. The patient currently exhibits no signs or symptoms suggestive of lung cancer. Discussed with patient the importance of compliance with yearly annual lung cancer screenings and willingness to undergo diagnosis and treatment if screening scan is positive. In addition, the patient was counseled regarding the importance of remaining smoke free and/or total smoking cessation.     Also reviewed the following if the patient has Medicare that as of February 10, 2022, Medicare only covers LDCT screening in patients aged 51-72 with at least a 20 pack-year smoking history who currently smoke or have quit in the last 15 years

## 2022-12-08 ENCOUNTER — TELEPHONE (OUTPATIENT)
Dept: PULMONOLOGY | Age: 57
End: 2022-12-08

## 2022-12-08 NOTE — TELEPHONE ENCOUNTER
Mattie Murphy from [address] called about patients FM [place] request form for patients condition of COPD and when filling out the paperwork on page 3 it was missing the certification date and they need that filled out for the claim to go through.      PH: [phone]  Toña Teran

## 2022-12-08 NOTE — TELEPHONE ENCOUNTER
Looked at forms and do not see where we need to add COPD Called [employer] back and told could not have ext.  For Iza Tirado and message was left for Iza Tirado to call me back

## 2022-12-12 NOTE — TELEPHONE ENCOUNTER
Called pt and informed her that Rocky called here last week wanting us to fix a section on her forms we completed but I do not understand what they are asking so I called them back last week and left message asking for a return call to clarify and have not heard anything back.

## 2022-12-13 NOTE — TELEPHONE ENCOUNTER
Patient left vm on overnight vm and said its on page 3 it says inermideltly my patient.      [facility]: [phone]

## 2022-12-27 ENCOUNTER — TELEPHONE (OUTPATIENT)
Dept: PULMONOLOGY | Age: 57
End: 2022-12-27

## 2022-12-27 RX ORDER — IPRATROPIUM BROMIDE AND ALBUTEROL SULFATE 2.5; .5 MG/3ML; MG/3ML
1 SOLUTION RESPIRATORY (INHALATION) EVERY 4 HOURS
Qty: 360 EACH | Refills: 3 | Status: CANCELLED | OUTPATIENT
Start: 2022-12-27

## 2022-12-27 NOTE — TELEPHONE ENCOUNTER
[facility] left VM on patient and mentioned patients medication Albuterol Inhaler solution 3 ml # 5 0.5/3mg to give call back.      [facility]: [phone]    Ref # [id number]

## 2023-03-22 ENCOUNTER — HOSPITAL ENCOUNTER (OUTPATIENT)
Dept: ULTRASOUND IMAGING | Age: 58
Discharge: HOME | End: 2023-03-22
Payer: COMMERCIAL

## 2023-03-22 DIAGNOSIS — R19.00 INTRA-ABDOMINAL AND PELVIC SWELLING, MASS AND LUMP, UNSPECIFIED SITE: ICD-10-CM

## 2023-03-22 PROCEDURE — 76700 US EXAM ABDOM COMPLETE: CPT

## 2023-04-20 ENCOUNTER — TELEPHONE (OUTPATIENT)
Dept: CASE MANAGEMENT | Age: 58
End: 2023-04-20

## 2023-05-24 ENCOUNTER — HOSPITAL ENCOUNTER (OUTPATIENT)
Dept: CT IMAGING | Age: 58
Discharge: HOME | End: 2023-05-24
Payer: COMMERCIAL

## 2023-05-24 DIAGNOSIS — Z87.891 PERSONAL HISTORY OF TOBACCO USE: ICD-10-CM

## 2023-05-24 PROCEDURE — 71271 CT THORAX LUNG CANCER SCR C-: CPT

## 2023-05-26 ENCOUNTER — TELEPHONE (OUTPATIENT)
Dept: CASE MANAGEMENT | Age: 58
End: 2023-05-26

## 2023-05-26 NOTE — TELEPHONE ENCOUNTER
Annual lung screen on 5/24/23. LRAD1. Recommended screen in one year. Reviewed by ordering physician and ordering office contacts pt with results.        Jessica Dobson BSN, RN   Lung Nodule Navigator  [facility]  [phone]

## 2023-08-09 ENCOUNTER — HOSPITAL ENCOUNTER (OUTPATIENT)
Dept: SLEEP CENTER | Age: 58
Discharge: HOME | End: 2023-08-09
Payer: COMMERCIAL

## 2023-08-09 DIAGNOSIS — G47.33 OBSTRUCTIVE SLEEP APNEA: Chronic | ICD-10-CM

## 2023-08-09 PROCEDURE — 95811 POLYSOM 6/>YRS CPAP 4/> PARM: CPT

## 2023-08-15 ENCOUNTER — TELEPHONE (OUTPATIENT)
Dept: PULMONOLOGY | Age: 58
End: 2023-08-15

## 2023-08-25 ENCOUNTER — OFFICE VISIT (OUTPATIENT)
Dept: PULMONOLOGY | Age: 58
End: 2023-08-25
Payer: COMMERCIAL

## 2023-08-25 VITALS
DIASTOLIC BLOOD PRESSURE: 70 MMHG | HEIGHT: 64 IN | HEART RATE: 84 BPM | SYSTOLIC BLOOD PRESSURE: 112 MMHG | BODY MASS INDEX: 37.9 KG/M2 | WEIGHT: 222 LBS | OXYGEN SATURATION: 96 %

## 2023-08-25 DIAGNOSIS — K21.00 GASTROESOPHAGEAL REFLUX DISEASE WITH ESOPHAGITIS WITHOUT HEMORRHAGE: Chronic | ICD-10-CM

## 2023-08-25 DIAGNOSIS — G47.33 OBSTRUCTIVE SLEEP APNEA: Chronic | ICD-10-CM

## 2023-08-25 DIAGNOSIS — R53.82 CHRONIC FATIGUE: ICD-10-CM

## 2023-08-25 DIAGNOSIS — R91.1 PULMONARY NODULE: ICD-10-CM

## 2023-08-25 DIAGNOSIS — J44.9 COPD, MILD (HCC): Primary | Chronic | ICD-10-CM

## 2023-08-25 DIAGNOSIS — J43.2 CENTRILOBULAR EMPHYSEMA (HCC): ICD-10-CM

## 2023-08-25 PROCEDURE — 99214 OFFICE O/P EST MOD 30 MIN: CPT | Performed by: INTERNAL MEDICINE

## 2023-08-25 RX ORDER — LOSARTAN POTASSIUM 50 MG/1
50 TABLET ORAL DAILY
COMMUNITY

## 2023-08-25 NOTE — PROGRESS NOTES
[facility]  Progress Note  MD Bobbi De Leon   YOB: 1965    Date of Visit:  8/25/2023    Assessment/Plan:  1. SOB (shortness of breath)  Worse  Gradually worsening    2. COPD, mild (HCC)/Emphysema  PFT 10/15:  Spirometry reveals normal FVC and FEV-1. FEV-1/FVC ratio is reduced. Lung  volumes reveal normal total lung capacity. Vital capacity is increased. Residual volume is decreased. Diffusion capacity is mildly decreased at 74%  of predicted. In comparison to a study from August of 2013, spirometry is  similar. IMPRESSION: Mild obstructive lung disease. PFT 8/18:  INTERPRETATION:  Spirometry attempts were acceptable and reproducible. FVC  was normal at 3.17 liters, 91% predicted and normal FEV1 2.46 liters, 90%  predicted. FEV1/FVC ratio was normal.  Lung volumes showed normal total  lung capacity of 90% predicted. Diffusion capacity showed decreased DLCO  of 70% predicted. IMPRESSION:  Normal spirometry and lung volumes with mild decrease in  diffusion capacity. In comparison to the test that was done in 10/2015,  FEV1 has improved by 9%. Advair==> gave her thrush  Anoro  Duoneb (she uses before Anoro and that really helps)      3. Obstructive sleep apnea (adult) (pediatric)  CPAP settings: Auto CPAP mode Pmin-6 Pmax-16 Flex-3 Hum-3 Average P- 7.6 Comp 7.5 hrs/night Download AHI - 2.1/hr Mask- Nasal Pillows Machine download/SD card data reviewed and documented. Excellent treatment effect  Follows with Dr Parris Ovalle    4. 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 Sleeping better and this has helped her fatigue some    5. Gastroesophageal reflux disease without esophagitis  Omeprazole 40 mg bid helps     6. Former smoker/PN  CT Chest 5/23:    FINDINGS:     AIRWAYS: The central airways are patent. LUNGS: No consolidation.  Tiny blebs or paraseptal emphysema right
emphysema involves the bilateral upper lungs. There is a new 3 mm solid right upper lobe pulmonary nodule. Upper Abdomen: Status post cholecystectomy. Soft Tissues/Bones: Degenerative changes involve the thoracic spine. Impression   1. New 3 mm solid right upper lobe pulmonary nodule. Repeat CT in April '23 which could be LDCT screening exam    FOLLOW UP: 6 months      Chief Complaint   Patient presents with    COPD    Shortness of Breath     Getting worse with minimal exertion     Wheezing     When lying down        HPI  The patient presents with a chief complaint of mild shortness of breath related to mild COPD of many years duration. She has mild associated cough. Exertion is her main modifying factor. No recent flare ups. No Chest pain, Nausea or vomiting reported. She did have COVID 3/22 and recovered from that. Review of Systems  No Chest pain, Nausea or vomiting reported      Physical Exam:  Well developed, well nourished  Alert and oriented  Sclera is clear  No cervical adenopathy  No JVD. Chest examination is clear. Cardiac examination reveals regular rate and rhythm without murmur, gallop or rub. The abdomen is soft, nontender and nondistended. There is no clubbing, cyanosis or edema of the extremities. There is no obvious skin rash. No focal neuro deficicts  Normal mood and affect    Allergies   Allergen Reactions    Penicillins Anaphylaxis and Rash    Codeine      Prior to Visit Medications    Medication Sig Taking?  Authorizing Provider   losartan (COZAAR) 50 MG tablet Take 1 tablet by mouth daily Yes Historical Provider, MD   omeprazole (PRILOSEC) 40 MG delayed release capsule Take 1 capsule by mouth in the morning and at bedtime Yes Caty Arthur MD   albuterol sulfate HFA (PROVENTIL;VENTOLIN;PROAIR) 108 (90 Base) MCG/ACT inhaler Inhale 2 puffs into the lungs every 6 hours as needed for Wheezing Yes Caty Arthur MD   [facility] ELLIPTA 62.5-25 MCG/INH

## 2023-10-30 ENCOUNTER — OFFICE VISIT (OUTPATIENT)
Dept: PULMONOLOGY | Age: 58
End: 2023-10-30
Payer: COMMERCIAL

## 2023-10-30 VITALS
OXYGEN SATURATION: 96 % | SYSTOLIC BLOOD PRESSURE: 109 MMHG | WEIGHT: 221.6 LBS | BODY MASS INDEX: 37.83 KG/M2 | HEART RATE: 78 BPM | DIASTOLIC BLOOD PRESSURE: 71 MMHG | HEIGHT: 64 IN

## 2023-10-30 DIAGNOSIS — Z79.4 TYPE 2 DIABETES MELLITUS WITHOUT COMPLICATION, WITH LONG-TERM CURRENT USE OF INSULIN (HCC): Chronic | ICD-10-CM

## 2023-10-30 DIAGNOSIS — G47.33 OBSTRUCTIVE SLEEP APNEA: Chronic | ICD-10-CM

## 2023-10-30 DIAGNOSIS — E11.9 TYPE 2 DIABETES MELLITUS WITHOUT COMPLICATION, WITH LONG-TERM CURRENT USE OF INSULIN (HCC): Chronic | ICD-10-CM

## 2023-10-30 DIAGNOSIS — J44.9 COPD, MILD (HCC): Chronic | ICD-10-CM

## 2023-10-30 PROCEDURE — 99214 OFFICE O/P EST MOD 30 MIN: CPT | Performed by: INTERNAL MEDICINE

## 2023-10-30 ASSESSMENT — SLEEP AND FATIGUE QUESTIONNAIRES
ESS TOTAL SCORE: 11
HOW LIKELY ARE YOU TO NOD OFF OR FALL ASLEEP WHILE SITTING INACTIVE IN A PUBLIC PLACE: 0
HOW LIKELY ARE YOU TO NOD OFF OR FALL ASLEEP IN A CAR, WHILE STOPPED FOR A FEW MINUTES IN TRAFFIC: 0
HOW LIKELY ARE YOU TO NOD OFF OR FALL ASLEEP WHILE SITTING AND READING: 2
HOW LIKELY ARE YOU TO NOD OFF OR FALL ASLEEP WHILE LYING DOWN TO REST IN THE AFTERNOON WHEN CIRCUMSTANCES PERMIT: 3
HOW LIKELY ARE YOU TO NOD OFF OR FALL ASLEEP WHEN YOU ARE A PASSENGER IN A CAR FOR AN HOUR WITHOUT A BREAK: 2
HOW LIKELY ARE YOU TO NOD OFF OR FALL ASLEEP WHILE WATCHING TV: 2
HOW LIKELY ARE YOU TO NOD OFF OR FALL ASLEEP WHILE SITTING AND TALKING TO SOMEONE: 0
HOW LIKELY ARE YOU TO NOD OFF OR FALL ASLEEP WHILE SITTING QUIETLY AFTER LUNCH WITHOUT ALCOHOL: 2

## 2023-10-30 NOTE — ASSESSMENT & PLAN NOTE
Chronic-Stable: Reviewed and analyzed results of physiologic download from patient's machine and reviewed with patient. Supplies and parts as needed for her machine. These are medically necessary.   Limit caffeine use after 3pm. Based on the analyzed data will change following settings: Pmin-12 to see the helps with her mouth dryness because there is minimal leak the most likely means her mouth is opening on inhale clear sign of pressure

## 2023-10-30 NOTE — PROGRESS NOTES
Kerry Pichardo CNP  Radha Coopertabitha CNP [facility] [address] [facility] [address], [address] [facility] [phone]   [address]  [address] [phone] [address] [address]  Dept: [phone]  Dept Fax: [phone]  Loc: [phone]      Assessment/Plan:      1. Obstructive sleep apnea  Assessment & Plan:  Chronic-Stable: Reviewed and analyzed results of physiologic download from patient's machine and reviewed with patient. Supplies and parts as needed for her machine. These are medically necessary. Limit caffeine use after 3pm. Based on the analyzed data will change following settings: Pmin-12 to see the helps with her mouth dryness because there is minimal leak the most likely means her mouth is opening on inhale clear sign of pressure    2. COPD, mild ([address])  Assessment & Plan:  Chronic- Stable. Discussed the importance of treating sleep apnea as part of the management of this disorder. Cont any meds per PCP and other physicians. 3. Type 2 diabetes mellitus without complication, with long-term current use of insulin (HCC)  Assessment & Plan:  Chronic- Stable. Discussed the importance of treating sleep apnea as part of the management of this disorder. Cont any meds per PCP and other physicians. Reviewed, analyzed, and documented physiologic data from patient's PAP machine. This information was analyzed to assess complexity and medical decision making in regards to further testing and management. Diagnoses of Obstructive sleep apnea, COPD, mild ([address]), and Type 2 diabetes mellitus without complication, with long-term current use of insulin ([address]) were pertinent to this visit. The chronic medical conditions listed are directly related to the primary diagnosis listed above.   The management of the primary diagnosis

## 2023-11-10 RX ORDER — UMECLIDINIUM BROMIDE AND VILANTEROL TRIFENATATE 62.5; 25 UG/1; UG/1
1 POWDER RESPIRATORY (INHALATION) DAILY
Qty: 180 EACH | Refills: 3 | Status: SHIPPED | OUTPATIENT
Start: 2023-11-10

## 2023-11-13 RX ORDER — UMECLIDINIUM BROMIDE AND VILANTEROL TRIFENATATE 62.5; 25 UG/1; UG/1
1 POWDER RESPIRATORY (INHALATION) DAILY
Qty: 180 EACH | Refills: 3 | Status: SHIPPED | OUTPATIENT
Start: 2023-11-13

## 2024-02-14 ENCOUNTER — HOSPITAL ENCOUNTER (OUTPATIENT)
Dept: ULTRASOUND IMAGING | Age: 59
Discharge: HOME | End: 2024-02-14
Attending: FAMILY MEDICINE
Payer: COMMERCIAL

## 2024-02-14 ENCOUNTER — OFFICE VISIT (OUTPATIENT)
Dept: PULMONOLOGY | Age: 59
End: 2024-02-14
Payer: COMMERCIAL

## 2024-02-14 VITALS
SYSTOLIC BLOOD PRESSURE: 124 MMHG | HEIGHT: 64 IN | DIASTOLIC BLOOD PRESSURE: 76 MMHG | WEIGHT: 228 LBS | HEART RATE: 100 BPM | OXYGEN SATURATION: 97 % | BODY MASS INDEX: 38.93 KG/M2

## 2024-02-14 DIAGNOSIS — J44.9 COPD, MILD (HCC): Primary | ICD-10-CM

## 2024-02-14 DIAGNOSIS — K21.00 GASTROESOPHAGEAL REFLUX DISEASE WITH ESOPHAGITIS WITHOUT HEMORRHAGE: Chronic | ICD-10-CM

## 2024-02-14 DIAGNOSIS — N95.0 POSTMENOPAUSAL BLEEDING: ICD-10-CM

## 2024-02-14 DIAGNOSIS — J43.2 CENTRILOBULAR EMPHYSEMA (HCC): ICD-10-CM

## 2024-02-14 DIAGNOSIS — G47.33 OBSTRUCTIVE SLEEP APNEA: Chronic | ICD-10-CM

## 2024-02-14 PROCEDURE — 76830 TRANSVAGINAL US NON-OB: CPT

## 2024-02-14 PROCEDURE — 99214 OFFICE O/P EST MOD 30 MIN: CPT | Performed by: INTERNAL MEDICINE

## 2024-02-14 PROCEDURE — 76856 US EXAM PELVIC COMPLETE: CPT

## 2024-02-14 RX ORDER — FOLIC ACID 1 MG/1
1 TABLET ORAL DAILY
COMMUNITY
Start: 2024-01-26

## 2024-02-14 RX ORDER — DOXYCYCLINE HYCLATE 100 MG/1
100 CAPSULE ORAL 2 TIMES DAILY
Qty: 20 CAPSULE | Refills: 0 | Status: SHIPPED | OUTPATIENT
Start: 2024-02-14 | End: 2024-02-24

## 2024-02-14 RX ORDER — ROPINIROLE 1 MG/1
1 TABLET, FILM COATED ORAL
COMMUNITY

## 2024-02-14 RX ORDER — ALBUTEROL SULFATE 90 UG/1
2 AEROSOL, METERED RESPIRATORY (INHALATION) EVERY 6 HOURS PRN
Qty: 54 G | Refills: 3 | Status: SHIPPED | OUTPATIENT
Start: 2024-02-14 | End: 2025-02-13

## 2024-02-14 RX ORDER — IPRATROPIUM BROMIDE AND ALBUTEROL SULFATE 2.5; .5 MG/3ML; MG/3ML
SOLUTION RESPIRATORY (INHALATION)
Qty: 810 ML | Refills: 3 | Status: SHIPPED | OUTPATIENT
Start: 2024-02-14

## 2024-02-14 RX ORDER — OMEPRAZOLE 40 MG/1
CAPSULE, DELAYED RELEASE ORAL
Qty: 180 CAPSULE | Refills: 3 | Status: SHIPPED | OUTPATIENT
Start: 2024-02-14

## 2024-02-14 RX ORDER — MONTELUKAST SODIUM 10 MG/1
10 TABLET ORAL DAILY
Qty: 90 TABLET | Refills: 3 | Status: SHIPPED | OUTPATIENT
Start: 2024-02-14

## 2024-02-14 RX ORDER — EMPAGLIFLOZIN 25 MG/1
25 TABLET, FILM COATED ORAL DAILY
COMMUNITY
Start: 2024-01-17

## 2024-02-14 RX ORDER — NITROFURANTOIN 25; 75 MG/1; MG/1
100 CAPSULE ORAL 2 TIMES DAILY
COMMUNITY
Start: 2024-01-05

## 2024-02-14 NOTE — PROGRESS NOTES
[facility] [place]  Progress Note  Ru Tracy MD       Susanna Byrne   YOB: 1965    Date of Visit:  2/14/2024    Assessment/Plan:  1. SOB (shortness of breath)  Worse  Gradually worsening    2. COPD, mild (HCC)/Emphysema  PFT 10/15:  Spirometry reveals normal FVC and FEV-1. FEV-1/FVC ratio is reduced. Lung  volumes reveal normal total lung capacity. Vital capacity is increased.   Residual volume is decreased. Diffusion capacity is mildly decreased at 74%  of predicted. In comparison to a study from August of 2013, spirometry is  similar.       IMPRESSION: Mild obstructive lung disease.    PFT 8/18:  INTERPRETATION:  Spirometry attempts were acceptable and reproducible.  FVC  was normal at 3.17 liters, 91% predicted and normal FEV1 2.46 liters, 90%  predicted.  FEV1/FVC ratio was normal.  Lung volumes showed normal total  lung capacity of 90% predicted.  Diffusion capacity showed decreased DLCO  of 70% predicted.     IMPRESSION:  Normal spirometry and lung volumes with mild decrease in  diffusion capacity.  In comparison to the test that was done in 10/2015,  FEV1 has improved by 9%.    Advair==> gave her thrush  Anoro ==> not covered and insurance won't tell what is.  I gave her scripts for Bevespi and Stiolto to see what is covered.  Duoneb (she uses before Anoro and that really helps)    [place] I will give her a script for Doxycycline to take with her    3. Obstructive sleep apnea (adult) (pediatric)  CPAP settings: Auto CPAP mode Pmin-6 Pmax-16 Flex-3 Hum-3 Average P- 7.6 Comp 7.5 hrs/night Download AHI - 2.1/hr Mask- Nasal Pillows Machine download/SD card data reviewed and documented.    Excellent treatment effect  Follows with Dr Szymanski    4. 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 Sleeping better and this has helped her fatigue some    5. Gastroesophageal reflux disease without

## 2024-02-22 ENCOUNTER — HOSPITAL ENCOUNTER (OUTPATIENT)
Dept: INTERVENTIONAL RADIOLOGY/VASCULAR | Age: 59
Discharge: HOME | End: 2024-02-24
Payer: COMMERCIAL

## 2024-02-22 DIAGNOSIS — I73.9 PERIPHERAL VASCULAR DISEASE, UNSPECIFIED (HCC): ICD-10-CM

## 2024-02-22 DIAGNOSIS — I73.9 PVD (PERIPHERAL VASCULAR DISEASE) (HCC): ICD-10-CM

## 2024-02-22 PROCEDURE — 93925 LOWER EXTREMITY STUDY: CPT

## 2024-03-05 DIAGNOSIS — J44.9 COPD, MILD (HCC): Primary | ICD-10-CM

## 2024-03-07 ENCOUNTER — PATIENT MESSAGE (OUTPATIENT)
Dept: PULMONOLOGY | Age: 59
End: 2024-03-07

## 2024-03-07 DIAGNOSIS — J44.9 COPD, MILD (HCC): Primary | ICD-10-CM

## 2024-03-08 NOTE — TELEPHONE ENCOUNTER
From: Susanna Byrne  To: Dr. Ru Tracy  Sent: 3/7/2024 2:01 PM EST  Subject: ANORO 90 days to mail order    Hello.   Thank you for the refill sent to [facility], but if the 90 days could be sent to mail order that would be great.

## 2024-03-11 RX ORDER — UMECLIDINIUM BROMIDE AND VILANTEROL TRIFENATATE 62.5; 25 UG/1; UG/1
1 POWDER RESPIRATORY (INHALATION) DAILY
Qty: 180 EACH | Refills: 3 | Status: SHIPPED | OUTPATIENT
Start: 2024-03-11

## 2024-04-09 ENCOUNTER — OFFICE VISIT (OUTPATIENT)
Dept: VASCULAR SURGERY | Age: 59
End: 2024-04-09
Payer: COMMERCIAL

## 2024-04-09 VITALS
SYSTOLIC BLOOD PRESSURE: 128 MMHG | HEIGHT: 64 IN | BODY MASS INDEX: 38.76 KG/M2 | DIASTOLIC BLOOD PRESSURE: 82 MMHG | WEIGHT: 227 LBS

## 2024-04-09 DIAGNOSIS — I73.9 PVD (PERIPHERAL VASCULAR DISEASE) (HCC): Primary | ICD-10-CM

## 2024-04-09 PROCEDURE — 99203 OFFICE O/P NEW LOW 30 MIN: CPT | Performed by: SURGERY

## 2024-04-09 NOTE — PROGRESS NOTES
MEASUREMENTS: The ABIs are normal bilaterally. All of the velocity measurements are within normal limits with exception of moderately dampened pulsatility of both peroneal arteries.. Excellent pulsatility is seen throughout both legs with good 2   vessel runoff in both calves.     IMPRESSION:  1. Moderate disease both peroneal arteries.        2. Study otherwise unremarkable.       No orders to display     No results found.    Assessment:   Lower extremity swelling      Plan:  58-year-old female with no evidence of arterial insufficiency with easily palpable pulses and normal noninvasive testing in February of this year.  I do think some of her symptoms could be related to underlying venous insufficiency and I recommended a trial of knee-high 20 to 30 mmHg medical grade support stockings.  I described in detail correct usage.  She will contact me and let me know if this is helped her symptoms      Jeremias Ordaz M.D., FACS.  4/9/2024  3:08 PM

## 2024-05-13 ENCOUNTER — TELEPHONE (OUTPATIENT)
Dept: CASE MANAGEMENT | Age: 59
End: 2024-05-13

## 2024-05-13 DIAGNOSIS — Z87.891 HISTORY OF TOBACCO USE: Primary | ICD-10-CM

## 2024-05-13 NOTE — TELEPHONE ENCOUNTER
Dr. Tracy,    Patient due for annual CT Lung screening. For your convenience, I have pended the order for the scan.  Please sign if you agree with annual screening for this pt.  I will help contact the pt to schedule.    Reminder letter mailed to pt.     Thanks,  Nova KASPERN, RN   Lung Nodule Navigator  [facility]  [phone]

## 2024-06-07 ENCOUNTER — HOSPITAL ENCOUNTER (OUTPATIENT)
Dept: CT IMAGING | Age: 59
Discharge: HOME | End: 2024-06-07
Attending: INTERNAL MEDICINE
Payer: COMMERCIAL

## 2024-06-07 DIAGNOSIS — Z87.891 HISTORY OF TOBACCO USE: ICD-10-CM

## 2024-06-07 PROCEDURE — 71271 CT THORAX LUNG CANCER SCR C-: CPT

## 2024-06-10 DIAGNOSIS — Z87.891 PERSONAL HISTORY OF TOBACCO USE: ICD-10-CM

## 2024-06-10 DIAGNOSIS — R91.1 PULMONARY NODULE: Primary | ICD-10-CM

## 2024-07-30 RX ORDER — IPRATROPIUM BROMIDE AND ALBUTEROL SULFATE 2.5; .5 MG/3ML; MG/3ML
SOLUTION RESPIRATORY (INHALATION)
Qty: 810 ML | Refills: 3 | Status: SHIPPED | OUTPATIENT
Start: 2024-07-30

## 2024-08-14 ENCOUNTER — PATIENT MESSAGE (OUTPATIENT)
Dept: PULMONOLOGY | Age: 59
End: 2024-08-14

## 2024-08-14 ENCOUNTER — OFFICE VISIT (OUTPATIENT)
Dept: PULMONOLOGY | Age: 59
End: 2024-08-14
Payer: COMMERCIAL

## 2024-08-14 VITALS
OXYGEN SATURATION: 97 % | HEART RATE: 76 BPM | SYSTOLIC BLOOD PRESSURE: 112 MMHG | DIASTOLIC BLOOD PRESSURE: 74 MMHG | HEIGHT: 64 IN | WEIGHT: 230.6 LBS | BODY MASS INDEX: 39.37 KG/M2

## 2024-08-14 DIAGNOSIS — Z87.891 FORMER SMOKER: ICD-10-CM

## 2024-08-14 DIAGNOSIS — K21.00 GASTROESOPHAGEAL REFLUX DISEASE WITH ESOPHAGITIS WITHOUT HEMORRHAGE: Chronic | ICD-10-CM

## 2024-08-14 DIAGNOSIS — J44.9 COPD, MILD (HCC): Primary | Chronic | ICD-10-CM

## 2024-08-14 DIAGNOSIS — J43.2 CENTRILOBULAR EMPHYSEMA (HCC): ICD-10-CM

## 2024-08-14 DIAGNOSIS — G47.33 OBSTRUCTIVE SLEEP APNEA: Chronic | ICD-10-CM

## 2024-08-14 PROCEDURE — 99214 OFFICE O/P EST MOD 30 MIN: CPT | Performed by: INTERNAL MEDICINE

## 2024-08-14 RX ORDER — ALBUTEROL SULFATE 90 UG/1
2 AEROSOL, METERED RESPIRATORY (INHALATION) EVERY 6 HOURS PRN
Qty: 54 G | Refills: 3 | Status: CANCELLED | OUTPATIENT
Start: 2024-08-14 | End: 2025-08-14

## 2024-08-14 NOTE — PROGRESS NOTES
[facility] [place]  Progress Note  Ru Tracy MD       Susanna Byrne   YOB: 1965    Date of Visit:  8/14/2024    Assessment/Plan:  1. SOB (shortness of breath)  Worse  Gradually worsening    2. COPD, mild (HCC)/Emphysema  PFT 10/15:  Spirometry reveals normal FVC and FEV-1. FEV-1/FVC ratio is reduced. Lung  volumes reveal normal total lung capacity. Vital capacity is increased.   Residual volume is decreased. Diffusion capacity is mildly decreased at 74%  of predicted. In comparison to a study from August of 2013, spirometry is  similar.       IMPRESSION: Mild obstructive lung disease.    PFT 8/18:  INTERPRETATION:  Spirometry attempts were acceptable and reproducible.  FVC  was normal at 3.17 liters, 91% predicted and normal FEV1 2.46 liters, 90%  predicted.  FEV1/FVC ratio was normal.  Lung volumes showed normal total  lung capacity of 90% predicted.  Diffusion capacity showed decreased DLCO  of 70% predicted.     IMPRESSION:  Normal spirometry and lung volumes with mild decrease in  diffusion capacity.  In comparison to the test that was done in 10/2015,  FEV1 has improved by 9%.    Advair==> gave her thrush  Anoro daily  Duoneb (she uses before Anoro and that really helps)  She uses Combivent as well. She finds that effective.       3. Obstructive sleep apnea (adult) (pediatric)  CPAP settings: Auto CPAP mode Pmin-6 Pmax-16 Flex-3 Hum-3 Average P- 7.6 Comp 7.5 hrs/night Download AHI - 2.1/hr Mask- Nasal Pillows Machine download/SD card data reviewed and documented.    Excellent treatment effect  Follows with Dr Szymanski    4. Gastroesophageal reflux disease without esophagitis  Omeprazole 40 mg bid helps     5. Former smoker/PN  CT Chest 6/24:  FINDINGS:  Mediastinum:  Thoracic aorta normal in course and caliber.  No enlarged  mediastinal or hilar lymph nodes by imaging size criteria.  Heart size within  normal limits.  Thyroid and esophagus unremarkable

## 2024-08-15 NOTE — TELEPHONE ENCOUNTER
This was completely at patient's appt.  Patient received a copy and a copy was faxed to employer.

## 2024-09-11 ENCOUNTER — HOSPITAL ENCOUNTER (OUTPATIENT)
Age: 59
Discharge: HOME | End: 2024-09-11
Attending: FAMILY MEDICINE

## 2024-09-15 ENCOUNTER — PATIENT MESSAGE (OUTPATIENT)
Dept: PULMONOLOGY | Age: 59
End: 2024-09-15

## 2024-09-16 DIAGNOSIS — J43.2 CENTRILOBULAR EMPHYSEMA (HCC): Primary | ICD-10-CM

## 2024-09-16 DIAGNOSIS — J44.9 COPD, MILD (HCC): ICD-10-CM

## 2024-09-16 RX ORDER — IPRATROPIUM BROMIDE AND ALBUTEROL SULFATE 2.5; .5 MG/3ML; MG/3ML
SOLUTION RESPIRATORY (INHALATION)
Qty: 810 ML | Refills: 11 | OUTPATIENT
Start: 2024-09-16

## 2024-12-05 ENCOUNTER — HOSPITAL ENCOUNTER (OUTPATIENT)
Age: 59
Discharge: HOME | End: 2024-12-07
Attending: FAMILY MEDICINE
Payer: COMMERCIAL

## 2024-12-05 ENCOUNTER — HOSPITAL ENCOUNTER (OUTPATIENT)
Dept: PULMONOLOGY | Age: 59
Discharge: HOME | End: 2024-12-05
Attending: INTERNAL MEDICINE
Payer: COMMERCIAL

## 2024-12-05 VITALS — OXYGEN SATURATION: 97 % | RESPIRATION RATE: 16 BRPM | HEART RATE: 84 BPM

## 2024-12-05 DIAGNOSIS — R06.09 OTHER FORMS OF DYSPNEA: ICD-10-CM

## 2024-12-05 DIAGNOSIS — J44.9 COPD, MILD (HCC): ICD-10-CM

## 2024-12-05 LAB
DLCO %PRED: 89 %
DLCO PRED: NORMAL
DLCO/VA %PRED: NORMAL
DLCO/VA PRED: NORMAL
DLCO/VA: NORMAL
DLCO: NORMAL
EXPIRATORY TIME-POST: NORMAL
EXPIRATORY TIME: NORMAL
FEF 25-75 %CHNG: NORMAL
FEF 25-75 POST %PRED: NORMAL
FEF 25-75% %PRED-PRE: NORMAL
FEF 25-75% PRED: NORMAL
FEF 25-75-POST: NORMAL
FEF 25-75-PRE: NORMAL
FEV1 %PRED-POST: NORMAL
FEV1 %PRED-PRE: 95 %
FEV1 PRED: NORMAL
FEV1-POST: NORMAL
FEV1-PRE: NORMAL
FEV1/FVC %PRED-POST: NORMAL
FEV1/FVC %PRED-PRE: NORMAL
FEV1/FVC PRED: NORMAL
FEV1/FVC-POST: NORMAL
FEV1/FVC-PRE: 71 %
FVC %PRED-POST: NORMAL
FVC %PRED-PRE: NORMAL
FVC PRED: NORMAL
FVC-POST: NORMAL
FVC-PRE: NORMAL
GAW %PRED: NORMAL
GAW PRED: NORMAL
GAW: NORMAL
IC PRE %PRED: NORMAL
IC PRED: NORMAL
IC: NORMAL
MEP: NORMAL
MIP: NORMAL
MVV %PRED-PRE: NORMAL
MVV PRED: NORMAL
MVV-PRE: NORMAL
NUC STRESS EJECTION FRACTION: 70 %
NUC STRESS LV EDV: 63 ML (ref 56–104)
NUC STRESS LV ESV: 19 ML (ref 19–49)
NUC STRESS LV MASS: 102 G
PEF %PRED-POST: NORMAL
PEF %PRED-PRE: NORMAL
PEF PRED: NORMAL
PEF%CHNG: NORMAL
PEF-POST: NORMAL
PEF-PRE: NORMAL
RAW %PRED: NORMAL
RAW PRED: NORMAL
RAW: NORMAL
RV PRE %PRED: NORMAL
RV PRED: NORMAL
RV: NORMAL
STRESS BASELINE DIAS BP: 68 MMHG
STRESS BASELINE HR: 78 BPM
STRESS BASELINE SYS BP: 103 MMHG
STRESS ESTIMATED WORKLOAD: 1 METS
STRESS EXERCISE DUR MIN: 4 MIN
STRESS EXERCISE DUR SEC: 0 SEC
STRESS O2 SAT PEAK: 97 %
STRESS O2 SAT REST: 95 %
STRESS PEAK DIAS BP: 57 MMHG
STRESS PEAK SYS BP: 107 MMHG
STRESS PERCENT HR ACHIEVED: 63 %
STRESS POST PEAK HR: 100 BPM
STRESS RATE PRESSURE PRODUCT: NORMAL BPM*MMHG
STRESS TARGET HR: 160 BPM
SVC %PRED: NORMAL
SVC PRED: NORMAL
SVC: NORMAL
TID: 1
TLC PRE %PRED: 99 %
TLC PRED: NORMAL
TLC: NORMAL
VA %PRED: NORMAL
VA PRED: NORMAL
VA: NORMAL
VTG %PRED: NORMAL
VTG PRED: NORMAL
VTG: NORMAL

## 2024-12-05 PROCEDURE — 93017 CV STRESS TEST TRACING ONLY: CPT

## 2024-12-05 PROCEDURE — 3430000000 HC RX DIAGNOSTIC RADIOPHARMACEUTICAL: Performed by: FAMILY MEDICINE

## 2024-12-05 PROCEDURE — 78452 HT MUSCLE IMAGE SPECT MULT: CPT

## 2024-12-05 PROCEDURE — 94726 PLETHYSMOGRAPHY LUNG VOLUMES: CPT

## 2024-12-05 PROCEDURE — A9502 TC99M TETROFOSMIN: HCPCS | Performed by: FAMILY MEDICINE

## 2024-12-05 PROCEDURE — 94729 DIFFUSING CAPACITY: CPT

## 2024-12-05 PROCEDURE — 94760 N-INVAS EAR/PLS OXIMETRY 1: CPT

## 2024-12-05 PROCEDURE — 94010 BREATHING CAPACITY TEST: CPT

## 2024-12-05 PROCEDURE — 6360000002 HC RX W HCPCS: Performed by: FAMILY MEDICINE

## 2024-12-05 RX ORDER — REGADENOSON 0.08 MG/ML
0.4 INJECTION, SOLUTION INTRAVENOUS
Status: COMPLETED | OUTPATIENT
Start: 2024-12-05 | End: 2024-12-05

## 2024-12-05 RX ORDER — ALBUTEROL SULFATE 90 UG/1
4 INHALANT RESPIRATORY (INHALATION) ONCE
Status: CANCELLED | OUTPATIENT
Start: 2024-12-05

## 2024-12-05 RX ADMIN — TETROFOSMIN 32.2 MILLICURIE: 1.38 INJECTION, POWDER, LYOPHILIZED, FOR SOLUTION INTRAVENOUS at 08:35

## 2024-12-05 RX ADMIN — TETROFOSMIN 10.9 MILLICURIE: 1.38 INJECTION, POWDER, LYOPHILIZED, FOR SOLUTION INTRAVENOUS at 07:20

## 2024-12-05 RX ADMIN — REGADENOSON 0.4 MG: 0.08 INJECTION, SOLUTION INTRAVENOUS at 08:39

## 2024-12-05 ASSESSMENT — PULMONARY FUNCTION TESTS
FEV1/FVC_PRE: 71
FEV1_PERCENT_PREDICTED_PRE: 95

## 2024-12-06 NOTE — PROCEDURES
Pulmonary Function Testing      Patient name:  Susanna Byrne      Unit #:   [id number]   Date of test:  12/5/2024   Date of interpretation:   12/6/2024    Ms. Susanna Byrne is a 59 y.o. year-old former smoker. The spirometry data were acceptable and reproducible.     Spirometry:  Flow volume loops were obstructed. The FEV-1/FVC ratio was decreased. The pre-bronchodilator FEV-1 was 2.26 liters (95% of predicted), which was normal. The FVC was 3.21 liters (107% of predicted), which was normal. Response to inhaled bronchodilators (albuterol) was not performed.    Lung volumes:  Lung volumes were tested by plethysmography. The total lung capacity was 5.04 liters (99% of predicted), which was normal. The residual volume was 1.56 liters (78% of predicted), which was decreased. The ratio of residual volume to total lung capacity (RV/TLC) was 31, which was decreased.     Diffusion capacity was found to be 89% predicted which is Normal.      Interpretation:  Mild obstruction was noted.    Comments:

## 2025-02-05 RX ORDER — OMEPRAZOLE 40 MG/1
CAPSULE, DELAYED RELEASE ORAL
Qty: 180 CAPSULE | Refills: 3 | Status: SHIPPED | OUTPATIENT
Start: 2025-02-05

## 2025-02-24 DIAGNOSIS — K21.00 GASTROESOPHAGEAL REFLUX DISEASE WITH ESOPHAGITIS WITHOUT HEMORRHAGE: Primary | ICD-10-CM

## 2025-02-24 RX ORDER — OMEPRAZOLE 40 MG/1
CAPSULE, DELAYED RELEASE ORAL
Qty: 180 CAPSULE | Refills: 3 | Status: SHIPPED | OUTPATIENT
Start: 2025-02-24

## 2025-02-25 ENCOUNTER — OFFICE VISIT (OUTPATIENT)
Dept: PULMONOLOGY | Age: 60
End: 2025-02-25
Payer: COMMERCIAL

## 2025-02-25 VITALS
DIASTOLIC BLOOD PRESSURE: 70 MMHG | HEIGHT: 64 IN | BODY MASS INDEX: 38.45 KG/M2 | HEART RATE: 84 BPM | SYSTOLIC BLOOD PRESSURE: 118 MMHG | WEIGHT: 225.2 LBS | OXYGEN SATURATION: 93 %

## 2025-02-25 DIAGNOSIS — Z87.891 PERSONAL HISTORY OF TOBACCO USE: Primary | ICD-10-CM

## 2025-02-25 PROCEDURE — G0296 VISIT TO DETERM LDCT ELIG: HCPCS | Performed by: INTERNAL MEDICINE

## 2025-02-25 PROCEDURE — 99214 OFFICE O/P EST MOD 30 MIN: CPT | Performed by: INTERNAL MEDICINE

## 2025-02-25 NOTE — PROGRESS NOTES
Provider, MD Eric       Vitals:    25 1406   BP: 118/70   Site: Left Upper Arm   Position: Sitting   Cuff Size: Medium Adult   Pulse: 84   SpO2: 93%   Weight: 102.2 kg (225 lb 3.2 oz)   Height: 1.626 m (5' 4\")       Body mass index is 38.66 kg/m².     Wt Readings from Last 3 Encounters:   25 102.2 kg (225 lb 3.2 oz)   24 104.6 kg (230 lb 9.6 oz)   24 103 kg (227 lb)     BP Readings from Last 3 Encounters:   25 118/70   24 112/74   24 128/82        Social History     Tobacco Use   Smoking Status Former   • Current packs/day: 0.00   • Average packs/day: 1 pack/day for 32.0 years (32.0 ttl pk-yrs)   • Types: Cigarettes   • Start date: 1980   • Quit date: 2012   • Years since quittin.2   • Passive exposure: Past   Smokeless Tobacco Never           Discussed with the patient the current USPSTF guidelines released 2021 for screening for lung cancer.    For adults aged 50 to 80 years who have a 20 pack-year smoking history and currently smoke or have quit within the past 15 years the grade B recommendation is to:  Screen for lung cancer with low-dose computed tomography (LDCT) every year.  Stop screening once a person has not smoked for 15 years or has a health problem that limits life expectancy or the ability to have lung surgery.    The patient  reports that she quit smoking about 12 years ago. Her smoking use included cigarettes. She started smoking about 44 years ago. She has a 32 pack-year smoking history. She has been exposed to tobacco smoke. She has never used smokeless tobacco.. Discussed with patient the risks and benefits of screening, including over-diagnosis, false positive rate, and total radiation exposure.  The patient currently exhibits no signs or symptoms suggestive of lung cancer.  Discussed with patient the importance of compliance with yearly annual lung cancer screenings and willingness to undergo diagnosis and treatment if

## 2025-03-03 DIAGNOSIS — J44.9 COPD, MILD (HCC): ICD-10-CM

## 2025-03-03 RX ORDER — UMECLIDINIUM BROMIDE AND VILANTEROL TRIFENATATE 62.5; 25 UG/1; UG/1
POWDER RESPIRATORY (INHALATION)
Qty: 180 G | Refills: 3 | Status: SHIPPED | OUTPATIENT
Start: 2025-03-03

## 2025-05-13 ENCOUNTER — CLINICAL DOCUMENTATION (OUTPATIENT)
Dept: CASE MANAGEMENT | Age: 60
End: 2025-05-13

## 2025-05-13 NOTE — PROGRESS NOTES
Patient meets lung screening criteria. Patient due for annual CT Lung Screening. Firstreminder letter mailed. If ordered, Patient may call [phone] to schedule.     Lung Screen Criteria  Age 50-80  Current smoker or quit within the last 15 years  Has => 20 pack year history.    Future Appointments   Date Time Provider Department Center   8/26/2025  1:30 PM Ru Tracy MD [facility]       Active Lung Screen order on chart: Yes

## 2025-08-13 ENCOUNTER — CLINICAL DOCUMENTATION (OUTPATIENT)
Dept: CASE MANAGEMENT | Age: 60
End: 2025-08-13

## 2025-08-20 ENCOUNTER — TELEPHONE (OUTPATIENT)
Dept: PULMONOLOGY | Age: 60
End: 2025-08-20